# Patient Record
Sex: FEMALE | Race: ASIAN | Employment: OTHER | URBAN - METROPOLITAN AREA
[De-identification: names, ages, dates, MRNs, and addresses within clinical notes are randomized per-mention and may not be internally consistent; named-entity substitution may affect disease eponyms.]

---

## 2017-04-07 ENCOUNTER — TELEPHONE (OUTPATIENT)
Dept: INTERNAL MEDICINE CLINIC | Facility: CLINIC | Age: 42
End: 2017-04-07

## 2017-04-07 NOTE — TELEPHONE ENCOUNTER
Patient is eligible for [facility]. Spoke to patient to assist in scheduling AHA states will call back.

## 2017-04-27 ENCOUNTER — OFFICE VISIT (OUTPATIENT)
Dept: FAMILY MEDICINE CLINIC | Facility: CLINIC | Age: 42
End: 2017-04-27

## 2017-04-27 VITALS
BODY MASS INDEX: 20.96 KG/M2 | WEIGHT: 104 LBS | TEMPERATURE: 99 F | DIASTOLIC BLOOD PRESSURE: 54 MMHG | SYSTOLIC BLOOD PRESSURE: 90 MMHG | HEIGHT: 59 IN | RESPIRATION RATE: 18 BRPM | HEART RATE: 78 BPM

## 2017-04-27 DIAGNOSIS — Z00.00 ROUTINE PHYSICAL EXAMINATION: Primary | ICD-10-CM

## 2017-04-27 DIAGNOSIS — Z12.31 VISIT FOR SCREENING MAMMOGRAM: ICD-10-CM

## 2017-04-27 PROCEDURE — 99396 PREV VISIT EST AGE 40-64: CPT | Performed by: FAMILY MEDICINE

## 2017-04-27 NOTE — PROGRESS NOTES
HPI:    Patient ID: Christina Azevedo is a 40year old female. HPI Comments: Patient is here for routine physical exam and exam for work. No acute issues. No significant chronic medical problems. Patient is requesting testing.  Diet and exercise have been fa edema or tenderness. Lymphadenopathy:     She has no cervical adenopathy. Neurological: She is alert. She has normal reflexes. Skin: No rash noted. Psychiatric: She has a normal mood and affect.  Her behavior is normal. Judgment and thought content

## 2017-04-28 ENCOUNTER — LAB ENCOUNTER (OUTPATIENT)
Dept: LAB | Facility: HOSPITAL | Age: 42
End: 2017-04-28
Attending: FAMILY MEDICINE
Payer: COMMERCIAL

## 2017-04-28 DIAGNOSIS — Z00.00 ROUTINE PHYSICAL EXAMINATION: ICD-10-CM

## 2017-04-28 PROCEDURE — 36415 COLL VENOUS BLD VENIPUNCTURE: CPT

## 2017-04-28 PROCEDURE — 80061 LIPID PANEL: CPT

## 2017-04-28 PROCEDURE — 80053 COMPREHEN METABOLIC PANEL: CPT

## 2017-04-28 PROCEDURE — 86480 TB TEST CELL IMMUN MEASURE: CPT

## 2017-04-28 PROCEDURE — 84443 ASSAY THYROID STIM HORMONE: CPT

## 2017-04-28 PROCEDURE — 85025 COMPLETE CBC W/AUTO DIFF WBC: CPT

## 2018-06-01 ENCOUNTER — OFFICE VISIT (OUTPATIENT)
Dept: INTERNAL MEDICINE CLINIC | Facility: CLINIC | Age: 43
End: 2018-06-01

## 2018-06-01 VITALS
WEIGHT: 108 LBS | TEMPERATURE: 99 F | DIASTOLIC BLOOD PRESSURE: 66 MMHG | BODY MASS INDEX: 21.77 KG/M2 | HEART RATE: 71 BPM | SYSTOLIC BLOOD PRESSURE: 102 MMHG | HEIGHT: 59 IN

## 2018-06-01 DIAGNOSIS — Z00.00 ROUTINE GENERAL MEDICAL EXAMINATION AT A HEALTH CARE FACILITY: Primary | ICD-10-CM

## 2018-06-01 DIAGNOSIS — Z12.31 VISIT FOR SCREENING MAMMOGRAM: ICD-10-CM

## 2018-06-01 DIAGNOSIS — Z12.4 CERVICAL CANCER SCREENING: ICD-10-CM

## 2018-06-01 DIAGNOSIS — Z11.1 SCREENING-PULMONARY TB: ICD-10-CM

## 2018-06-01 DIAGNOSIS — N89.8 VAGINAL DISCHARGE: ICD-10-CM

## 2018-06-01 PROCEDURE — 99386 PREV VISIT NEW AGE 40-64: CPT | Performed by: INTERNAL MEDICINE

## 2018-06-01 RX ORDER — ASCORBIC ACID 500 MG
500 TABLET ORAL DAILY
COMMUNITY

## 2018-06-01 NOTE — PROGRESS NOTES
HPI:    Patient ID: Aniceto Rinne is a 44year old female.     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 She has no axillary adenopathy. Neurological: She is alert. Skin: No rash noted. She is not diaphoretic. No erythema. Nursing note and vitals reviewed.            ASSESSMENT/PLAN:   (Z00.00) R

## 2018-06-04 ENCOUNTER — TELEPHONE (OUTPATIENT)
Dept: OTHER | Age: 43
End: 2018-06-04

## 2018-06-04 ENCOUNTER — TELEPHONE (OUTPATIENT)
Dept: INTERNAL MEDICINE CLINIC | Facility: CLINIC | Age: 43
End: 2018-06-04

## 2018-06-04 NOTE — TELEPHONE ENCOUNTER
Candida vaginitis  positive vaginosis screen  Pt not aware   Call her   diflucan sent to pharmacy for

## 2018-06-04 NOTE — TELEPHONE ENCOUNTER
Yovani Cotter called to provide update on vaginal culture. Reported as Negative for candida but it is actually POSITIVE.   Lab corrected report in EPIC

## 2018-06-05 NOTE — TELEPHONE ENCOUNTER
Pt called to F/U ; Spoke with patient (identified name and ), results reviewed and agrees with plan.         Notes recorded by Claudean Shope, MD on 2018 at 8:51 PM CDT  Candida vaginitis  positive vaginosis screen  Pt not aware   Call her   tatiana

## 2018-06-06 RX ORDER — FLUCONAZOLE 150 MG/1
TABLET ORAL
Qty: 2 TABLET | Refills: 0 | Status: SHIPPED | OUTPATIENT
Start: 2018-06-06 | End: 2019-06-04 | Stop reason: ALTCHOICE

## 2018-06-06 NOTE — TELEPHONE ENCOUNTER
New script created, sent to pharmacy.  Called pharmacy, still did not received the script, verbal order given to pharmacist.

## 2018-06-06 NOTE — TELEPHONE ENCOUNTER
Per pt the rx med that was sent to pharmacy on file, pharmacy stts that they did not received it,  Pls resend pt is waiting.

## 2018-06-16 ENCOUNTER — APPOINTMENT (OUTPATIENT)
Dept: LAB | Facility: HOSPITAL | Age: 43
End: 2018-06-16
Attending: INTERNAL MEDICINE
Payer: COMMERCIAL

## 2018-06-16 DIAGNOSIS — Z00.00 ROUTINE GENERAL MEDICAL EXAMINATION AT A HEALTH CARE FACILITY: ICD-10-CM

## 2018-06-16 DIAGNOSIS — Z11.1 SCREENING-PULMONARY TB: ICD-10-CM

## 2018-06-16 PROCEDURE — 84443 ASSAY THYROID STIM HORMONE: CPT

## 2018-06-16 PROCEDURE — 85027 COMPLETE CBC AUTOMATED: CPT

## 2018-06-16 PROCEDURE — 36415 COLL VENOUS BLD VENIPUNCTURE: CPT

## 2018-06-16 PROCEDURE — 83036 HEMOGLOBIN GLYCOSYLATED A1C: CPT

## 2018-06-16 PROCEDURE — 84439 ASSAY OF FREE THYROXINE: CPT

## 2018-06-16 PROCEDURE — 81015 MICROSCOPIC EXAM OF URINE: CPT

## 2018-06-16 PROCEDURE — 86480 TB TEST CELL IMMUN MEASURE: CPT

## 2018-06-16 PROCEDURE — 80053 COMPREHEN METABOLIC PANEL: CPT

## 2018-06-16 PROCEDURE — 80061 LIPID PANEL: CPT

## 2018-12-21 ENCOUNTER — NURSE TRIAGE (OUTPATIENT)
Dept: OTHER | Age: 43
End: 2018-12-21

## 2018-12-21 DIAGNOSIS — N92.6 MISSED PERIOD: Primary | ICD-10-CM

## 2018-12-21 NOTE — TELEPHONE ENCOUNTER
Action Requested: Summary for Provider     []  Critical Lab, Recommendations Needed  [] Need Additional Advice  []   FYI    []   Need Orders  [] Need Medications Sent to Pharmacy  []  Other     SUMMARY: The patient is requesting a blood test for pregnancy.

## 2018-12-22 ENCOUNTER — APPOINTMENT (OUTPATIENT)
Dept: LAB | Facility: HOSPITAL | Age: 43
End: 2018-12-22
Attending: INTERNAL MEDICINE
Payer: COMMERCIAL

## 2018-12-22 PROCEDURE — 36415 COLL VENOUS BLD VENIPUNCTURE: CPT | Performed by: INTERNAL MEDICINE

## 2018-12-22 PROCEDURE — 84703 CHORIONIC GONADOTROPIN ASSAY: CPT | Performed by: INTERNAL MEDICINE

## 2019-01-21 ENCOUNTER — TELEPHONE (OUTPATIENT)
Dept: INTERNAL MEDICINE CLINIC | Facility: CLINIC | Age: 44
End: 2019-01-21

## 2019-01-21 NOTE — TELEPHONE ENCOUNTER
Called Pt to clarify why she was requesting an x-ray of the lower back . Pt states she slipped and fell on icy area is currently having pain on her sacral area . Pain when walking or sitting declined appt at this time . Dr Freedom Hewitt please advise .

## 2019-01-25 ENCOUNTER — HOSPITAL ENCOUNTER (OUTPATIENT)
Dept: GENERAL RADIOLOGY | Facility: HOSPITAL | Age: 44
Discharge: HOME | End: 2019-01-25
Attending: INTERNAL MEDICINE
Payer: COMMERCIAL

## 2019-01-25 DIAGNOSIS — M54.5 ACUTE LOW BACK PAIN, UNSPECIFIED BACK PAIN LATERALITY, WITH SCIATICA PRESENCE UNSPECIFIED: ICD-10-CM

## 2019-01-25 PROCEDURE — 72220 X-RAY EXAM SACRUM TAILBONE: CPT | Performed by: INTERNAL MEDICINE

## 2019-03-06 ENCOUNTER — WALK IN (OUTPATIENT)
Dept: URGENT CARE | Age: 44
End: 2019-03-06

## 2019-03-06 VITALS
TEMPERATURE: 98.2 F | HEART RATE: 69 BPM | HEIGHT: 60 IN | DIASTOLIC BLOOD PRESSURE: 70 MMHG | BODY MASS INDEX: 21.2 KG/M2 | OXYGEN SATURATION: 98 % | WEIGHT: 108 LBS | SYSTOLIC BLOOD PRESSURE: 110 MMHG | RESPIRATION RATE: 18 BRPM

## 2019-03-06 DIAGNOSIS — J20.9 ACUTE BRONCHITIS, UNSPECIFIED ORGANISM: Primary | ICD-10-CM

## 2019-03-06 PROCEDURE — 99203 OFFICE O/P NEW LOW 30 MIN: CPT | Performed by: NURSE PRACTITIONER

## 2019-03-06 RX ORDER — BENZONATATE 200 MG/1
200 CAPSULE ORAL 3 TIMES DAILY PRN
Qty: 15 CAPSULE | Refills: 0 | Status: SHIPPED | OUTPATIENT
Start: 2019-03-06 | End: 2019-03-11

## 2019-03-06 RX ORDER — ASCORBIC ACID 500 MG
500 TABLET ORAL DAILY
COMMUNITY

## 2019-03-06 ASSESSMENT — ENCOUNTER SYMPTOMS
SINUS PAIN: 0
HEADACHES: 0
DIARRHEA: 0
CHEST TIGHTNESS: 0
SWEATS: 0
NAUSEA: 0
WHEEZING: 0
RHINORRHEA: 0
SLEEP DISTURBANCE: 0
CHILLS: 0
WEAKNESS: 0
EYE DISCHARGE: 0
HEMOPTYSIS: 0
EYE PAIN: 0
NERVOUS/ANXIOUS: 0
ABDOMINAL PAIN: 0
VOICE CHANGE: 0
EYE ITCHING: 0
FATIGUE: 0
FEVER: 0
ACTIVITY CHANGE: 0
LIGHT-HEADEDNESS: 0
VOMITING: 0
COUGH: 1
HEARTBURN: 0
PHOTOPHOBIA: 0
SORE THROAT: 0
SHORTNESS OF BREATH: 0
ADENOPATHY: 0
DIZZINESS: 0
EYE REDNESS: 0
CONSTIPATION: 0
APPETITE CHANGE: 0
TROUBLE SWALLOWING: 0
WEIGHT LOSS: 0
CONFUSION: 0
SINUS PRESSURE: 0

## 2019-03-06 ASSESSMENT — COPD QUESTIONNAIRES: COPD: 0

## 2019-06-04 ENCOUNTER — OFFICE VISIT (OUTPATIENT)
Dept: INTERNAL MEDICINE CLINIC | Facility: CLINIC | Age: 44
End: 2019-06-04
Payer: COMMERCIAL

## 2019-06-04 VITALS
WEIGHT: 110 LBS | SYSTOLIC BLOOD PRESSURE: 96 MMHG | BODY MASS INDEX: 22.18 KG/M2 | DIASTOLIC BLOOD PRESSURE: 62 MMHG | HEIGHT: 59 IN | HEART RATE: 69 BPM

## 2019-06-04 DIAGNOSIS — Z12.31 VISIT FOR SCREENING MAMMOGRAM: ICD-10-CM

## 2019-06-04 DIAGNOSIS — Z00.00 ROUTINE GENERAL MEDICAL EXAMINATION AT A HEALTH CARE FACILITY: Primary | ICD-10-CM

## 2019-06-04 DIAGNOSIS — Z11.1 SCREENING-PULMONARY TB: ICD-10-CM

## 2019-06-04 PROCEDURE — 99396 PREV VISIT EST AGE 40-64: CPT | Performed by: INTERNAL MEDICINE

## 2019-06-04 RX ORDER — ACETAMINOPHEN 500 MG
500 TABLET ORAL EVERY 6 HOURS PRN
COMMUNITY

## 2019-06-14 ENCOUNTER — APPOINTMENT (OUTPATIENT)
Dept: LAB | Facility: HOSPITAL | Age: 44
End: 2019-06-14
Attending: INTERNAL MEDICINE
Payer: COMMERCIAL

## 2019-06-14 DIAGNOSIS — Z00.00 ROUTINE GENERAL MEDICAL EXAMINATION AT A HEALTH CARE FACILITY: ICD-10-CM

## 2019-06-14 DIAGNOSIS — Z11.1 SCREENING-PULMONARY TB: ICD-10-CM

## 2019-06-14 PROCEDURE — 83036 HEMOGLOBIN GLYCOSYLATED A1C: CPT

## 2019-06-14 PROCEDURE — 85027 COMPLETE CBC AUTOMATED: CPT

## 2019-06-14 PROCEDURE — 81015 MICROSCOPIC EXAM OF URINE: CPT

## 2019-06-14 PROCEDURE — 80053 COMPREHEN METABOLIC PANEL: CPT

## 2019-06-14 PROCEDURE — 86480 TB TEST CELL IMMUN MEASURE: CPT

## 2019-06-14 PROCEDURE — 84439 ASSAY OF FREE THYROXINE: CPT

## 2019-06-14 PROCEDURE — 84443 ASSAY THYROID STIM HORMONE: CPT

## 2019-06-14 PROCEDURE — 36415 COLL VENOUS BLD VENIPUNCTURE: CPT

## 2019-06-14 PROCEDURE — 80061 LIPID PANEL: CPT

## 2019-06-14 NOTE — PROGRESS NOTES
HPI:    Patient ID: Glorietta Peabody is a 38year old female.     HPI    PHysical exam  Generally healthy    BP 96/62 (BP Location: Right arm, Patient Position: Sitting, Cuff Size: adult)   Pulse 69   Ht 4' 11\" (1.499 m)   Wt 110 lb (49.9 kg)   LMP 05/17/2019 Diagnosis Date   • Bilateral dry eyes 3/24/2016   • Meibomian gland dysfunction (MGD), bilateral, both upper and lower lids 3/24/2016      Past Surgical History:   Procedure Laterality Date   • SKIN SURGERY  2005    sebaceous cyst removal between breasts THYROID STIM HORMONE, FREE T4 (FREE         THYROXINE), LIPID PANEL, CBC, PLATELET; NO         DIFFERENTIAL, COMP METABOLIC PANEL (14),         HEMOGLOBIN A1C, URINE MICROSCOPIC W REFLEX         CULTURE        Generally healthy    (Z12.31) Visit for screen

## 2020-06-12 ENCOUNTER — OFFICE VISIT (OUTPATIENT)
Dept: INTERNAL MEDICINE CLINIC | Facility: CLINIC | Age: 45
End: 2020-06-12
Payer: COMMERCIAL

## 2020-06-12 VITALS
HEART RATE: 71 BPM | DIASTOLIC BLOOD PRESSURE: 55 MMHG | WEIGHT: 109 LBS | BODY MASS INDEX: 21.97 KG/M2 | SYSTOLIC BLOOD PRESSURE: 87 MMHG | HEIGHT: 59 IN | TEMPERATURE: 99 F

## 2020-06-12 DIAGNOSIS — Z00.00 ROUTINE GENERAL MEDICAL EXAMINATION AT A HEALTH CARE FACILITY: Primary | ICD-10-CM

## 2020-06-12 DIAGNOSIS — Z12.31 VISIT FOR SCREENING MAMMOGRAM: ICD-10-CM

## 2020-06-12 DIAGNOSIS — Z11.1 SCREENING-PULMONARY TB: ICD-10-CM

## 2020-06-12 PROCEDURE — 99396 PREV VISIT EST AGE 40-64: CPT | Performed by: INTERNAL MEDICINE

## 2020-06-12 NOTE — PROGRESS NOTES
HPI:    Patient ID: Tyson Carmona is a 41year old female.     HPI    Physical exam  Generally healthy  Aches and pain  Neck upper back  And right arm occas tingling  Works as a nurse  Generally healthy    BP (!) 87/55 (BP Location: Left arm, Patient Posi STRENGTH) 500 MG Oral Tab Take 500 mg by mouth every 6 (six) hours as needed for Pain. • Vitamin C 500 MG Oral Tab Take 500 mg by mouth daily. • folic acid (FOLVITE) 877 MCG Oral Tab Take 400 mcg by mouth daily.        Allergies:No Known Allergies Genitourinary:    Genitourinary Comments: . Breast exam.  Bilateral symmetric  No discrete palpable masses or tenderness  No nipple discharge  And no axillary adenopathy. Musculoskeletal:         General: No tenderness or edema.    Lymphadenopathy:

## 2020-06-13 ENCOUNTER — APPOINTMENT (OUTPATIENT)
Dept: LAB | Facility: HOSPITAL | Age: 45
End: 2020-06-13
Attending: INTERNAL MEDICINE
Payer: COMMERCIAL

## 2020-06-13 DIAGNOSIS — Z00.00 ROUTINE GENERAL MEDICAL EXAMINATION AT A HEALTH CARE FACILITY: ICD-10-CM

## 2020-06-13 PROCEDURE — 83036 HEMOGLOBIN GLYCOSYLATED A1C: CPT

## 2020-06-13 PROCEDURE — 80053 COMPREHEN METABOLIC PANEL: CPT

## 2020-06-13 PROCEDURE — 93005 ELECTROCARDIOGRAM TRACING: CPT

## 2020-06-13 PROCEDURE — 93010 ELECTROCARDIOGRAM REPORT: CPT | Performed by: INTERNAL MEDICINE

## 2020-06-13 PROCEDURE — 84443 ASSAY THYROID STIM HORMONE: CPT

## 2020-06-13 PROCEDURE — 84439 ASSAY OF FREE THYROXINE: CPT

## 2020-06-13 PROCEDURE — 85027 COMPLETE CBC AUTOMATED: CPT

## 2020-06-13 PROCEDURE — 86480 TB TEST CELL IMMUN MEASURE: CPT

## 2020-06-13 PROCEDURE — 80061 LIPID PANEL: CPT

## 2020-06-13 PROCEDURE — 36415 COLL VENOUS BLD VENIPUNCTURE: CPT

## 2021-01-21 ENCOUNTER — PATIENT MESSAGE (OUTPATIENT)
Dept: INTERNAL MEDICINE CLINIC | Facility: CLINIC | Age: 46
End: 2021-01-21

## 2021-01-21 ENCOUNTER — NURSE TRIAGE (OUTPATIENT)
Dept: INTERNAL MEDICINE CLINIC | Facility: CLINIC | Age: 46
End: 2021-01-21

## 2021-01-22 ENCOUNTER — OFFICE VISIT (OUTPATIENT)
Dept: INTERNAL MEDICINE CLINIC | Facility: CLINIC | Age: 46
End: 2021-01-22
Payer: COMMERCIAL

## 2021-01-22 VITALS
WEIGHT: 112 LBS | DIASTOLIC BLOOD PRESSURE: 73 MMHG | HEIGHT: 59 IN | BODY MASS INDEX: 22.58 KG/M2 | HEART RATE: 83 BPM | SYSTOLIC BLOOD PRESSURE: 107 MMHG

## 2021-01-22 DIAGNOSIS — T50.905A REACTION TO SHOT, INITIAL ENCOUNTER: Primary | ICD-10-CM

## 2021-01-22 PROCEDURE — 3078F DIAST BP <80 MM HG: CPT | Performed by: INTERNAL MEDICINE

## 2021-01-22 PROCEDURE — 3074F SYST BP LT 130 MM HG: CPT | Performed by: INTERNAL MEDICINE

## 2021-01-22 PROCEDURE — 99212 OFFICE O/P EST SF 10 MIN: CPT | Performed by: INTERNAL MEDICINE

## 2021-01-22 PROCEDURE — 3008F BODY MASS INDEX DOCD: CPT | Performed by: INTERNAL MEDICINE

## 2021-01-22 NOTE — TELEPHONE ENCOUNTER
[address] [place] [address] 3:40 pm Dr Tomasa Chaudhary today. Advised to wear mask/medical mask, at door screening, care partner policy; patient verbalized understanding. See ComptTIAhart message below. Patient had Benitez Ramirez covid vaccine 1/13/2021 on left deltoid.  Today, area

## 2021-01-22 NOTE — TELEPHONE ENCOUNTER
----- Message from Tuyet Ramon sent at 1/21/2021  9:15 PM CST -----  Regarding: Other  Contact: [phone]  Good Evening! I had my first dose of Moderna Covid-19  vaccine last 1/13/21. I had chills for 2 nights and sore arm for almost a week.  2 da

## 2021-01-22 NOTE — TELEPHONE ENCOUNTER
From: Ori Longoria  To: Zack Hernadez MD  Sent: 1/21/2021 9:15 PM CST  Subject: Other    Good Evening! I had my first dose of Moderna Covid-19 vaccine last 1/13/21. I had chills for 2 nights and sore arm for almost a week.  2 days after the vacci

## 2021-01-24 NOTE — PROGRESS NOTES
HPI:    Patient ID: Carmen Yeboah is a 41year old female.     HPI    moderna vaccine   Site left deltoid developed redness warmth swelling  No other associ symptoms   Worse yesterday now improivng    /73 (BP Location: Left arm, Patient Position Relation Age of Onset   • Hypertension Father    • Hypertension Brother    • Glaucoma Brother    • Diabetes Neg    • Macular degeneration Neg       Social History: Social History    Tobacco Use      Smoking status: Never Smoker      Smokeless tobacco: Valentina Soliman

## 2021-06-22 ENCOUNTER — OFFICE VISIT (OUTPATIENT)
Dept: INTERNAL MEDICINE CLINIC | Facility: CLINIC | Age: 46
End: 2021-06-22
Payer: COMMERCIAL

## 2021-06-22 VITALS
HEART RATE: 75 BPM | WEIGHT: 107.19 LBS | BODY MASS INDEX: 21.61 KG/M2 | DIASTOLIC BLOOD PRESSURE: 58 MMHG | HEIGHT: 59 IN | SYSTOLIC BLOOD PRESSURE: 93 MMHG

## 2021-06-22 DIAGNOSIS — Z00.00 ROUTINE GENERAL MEDICAL EXAMINATION AT A HEALTH CARE FACILITY: Primary | ICD-10-CM

## 2021-06-22 DIAGNOSIS — Z12.31 VISIT FOR SCREENING MAMMOGRAM: ICD-10-CM

## 2021-06-22 DIAGNOSIS — Z11.1 SCREENING-PULMONARY TB: ICD-10-CM

## 2021-06-22 DIAGNOSIS — Z12.4 CERVICAL CANCER SCREENING: ICD-10-CM

## 2021-06-22 PROCEDURE — 3078F DIAST BP <80 MM HG: CPT | Performed by: INTERNAL MEDICINE

## 2021-06-22 PROCEDURE — 3074F SYST BP LT 130 MM HG: CPT | Performed by: INTERNAL MEDICINE

## 2021-06-22 PROCEDURE — 99396 PREV VISIT EST AGE 40-64: CPT | Performed by: INTERNAL MEDICINE

## 2021-06-22 PROCEDURE — 3008F BODY MASS INDEX DOCD: CPT | Performed by: INTERNAL MEDICINE

## 2021-06-23 LAB — HPV I/H RISK 1 DNA SPEC QL NAA+PROBE: NEGATIVE

## 2021-06-25 ENCOUNTER — LAB ENCOUNTER (OUTPATIENT)
Dept: LAB | Facility: HOSPITAL | Age: 46
End: 2021-06-25
Attending: INTERNAL MEDICINE
Payer: COMMERCIAL

## 2021-06-25 DIAGNOSIS — Z11.1 SCREENING EXAMINATION FOR PULMONARY TUBERCULOSIS: Primary | ICD-10-CM

## 2021-06-25 DIAGNOSIS — Z00.00 ROUTINE GENERAL MEDICAL EXAMINATION AT A HEALTH CARE FACILITY: ICD-10-CM

## 2021-06-25 PROCEDURE — 86480 TB TEST CELL IMMUN MEASURE: CPT

## 2021-06-25 PROCEDURE — 81015 MICROSCOPIC EXAM OF URINE: CPT

## 2021-06-25 PROCEDURE — 80061 LIPID PANEL: CPT

## 2021-06-25 PROCEDURE — 85027 COMPLETE CBC AUTOMATED: CPT

## 2021-06-25 PROCEDURE — 36415 COLL VENOUS BLD VENIPUNCTURE: CPT

## 2021-06-25 PROCEDURE — 84439 ASSAY OF FREE THYROXINE: CPT

## 2021-06-25 PROCEDURE — 80053 COMPREHEN METABOLIC PANEL: CPT

## 2021-06-25 PROCEDURE — 83036 HEMOGLOBIN GLYCOSYLATED A1C: CPT

## 2021-06-25 PROCEDURE — 84443 ASSAY THYROID STIM HORMONE: CPT

## 2021-07-01 NOTE — PROGRESS NOTES
HPI:    Patient ID: Reinier Garcia is a 40year old female.     HPI    BP 93/58 (BP Location: Right arm, Patient Position: Sitting, Cuff Size: adult)   Pulse 75   Ht 4' 11\" (1.499 m)   Wt 107 lb 3.2 oz (48.6 kg)   LMP 04/12/2021 (Within Months)   BMI (MULTI-VITAMIN) Oral Tab Take 1 tablet by mouth daily. • acetaminophen (TYLENOL EXTRA STRENGTH) 500 MG Oral Tab Take 500 mg by mouth every 6 (six) hours as needed for Pain. • Vitamin C 500 MG Oral Tab Take 500 mg by mouth daily.      • folic acid (F distress. Breath sounds: Normal breath sounds. No wheezing or rales. Chest:      Chest wall: No tenderness. Abdominal:      General: Bowel sounds are normal. There is no distension. Palpations: Abdomen is soft. There is no mass.       Lisha Nathan A1C      Urine Microscopic w Reflex CULTURE      QUANTIFERON TB GOLD (IN TUBE)      Hpv Dna  High Risk , Thin Prep Collect      ThinPrep PAP Smear      THINPREP PAP SMEAR ONLY      Meds This Visit:  Requested Prescriptions      No prescriptions requested o

## 2021-07-16 ENCOUNTER — LAB ENCOUNTER (OUTPATIENT)
Dept: LAB | Facility: HOSPITAL | Age: 46
End: 2021-07-16
Attending: INTERNAL MEDICINE
Payer: COMMERCIAL

## 2021-07-16 DIAGNOSIS — R82.90 ABNORMAL URINE: ICD-10-CM

## 2021-07-16 PROCEDURE — 81015 MICROSCOPIC EXAM OF URINE: CPT

## 2021-08-26 ENCOUNTER — NURSE TRIAGE (OUTPATIENT)
Dept: INTERNAL MEDICINE CLINIC | Facility: CLINIC | Age: 46
End: 2021-08-26

## 2021-08-26 ENCOUNTER — PATIENT MESSAGE (OUTPATIENT)
Dept: INTERNAL MEDICINE CLINIC | Facility: CLINIC | Age: 46
End: 2021-08-26

## 2021-08-26 NOTE — TELEPHONE ENCOUNTER
----- Message from Tuyet Antony sent at 8/26/2021  5:57 PM CDT -----  Regarding: Other  Contact: [phone]  Good afternoon    Please refer to attached photos.   The symptoms (redness, itching, swelling)  started yesterday noon and have gotten worse t

## 2021-08-26 NOTE — TELEPHONE ENCOUNTER
Please contact pt to triage.   See my chart message below    Please see other my chart message with images

## 2021-08-26 NOTE — TELEPHONE ENCOUNTER
From: Oscar Hancock  To: Annie Benavidez MD  Sent: 8/26/2021 5:57 PM CDT  Subject: Other    Good afternoon    Please refer to attached photos. The symptoms (redness, itching, swelling) started yesterday noon and have gotten worse today.  I don't have

## 2021-08-27 NOTE — TELEPHONE ENCOUNTER
Action Requested: Summary for Provider     []  Critical Lab, Recommendations Needed  [] Need Additional Advice  [x]   FYI    []   Need Orders  [] Need Medications Sent to Pharmacy  []  Other     SUMMARY: Patient compliant of rash on left leg that started o

## 2021-08-28 NOTE — TELEPHONE ENCOUNTER
Per chart review, patient is currently at [facility]    No further action needed by Triage

## 2022-06-14 NOTE — LETTER
10/16/2024              Chris Weaver        [address] [place] [id number]     To Whom It May Concern,    Chris Weaver (3/17/1975) is under my medical care. She was seen for her complete physical examination on 10/16.24. She is was found in excellent health   and is free of communicable diseases.  She may work without restrictions.    Sincerely,    Akosua Alvarez MD          Document electronically generated by:  Akosua Alvarez MD   
10/16/2024              Chris Weaver        [address] [place] [id number]     To Whom It May Concern,    Chris Weaver (3/17/1975) is under my medical care. She was seen for her complete physical examination and was found in excellent health  free of communicable diseases.  She may work without restrictions.    Sincerely,    Akosua Alvarez MD          Document electronically generated by:  Akosua Alvarez MD   
Mother's Bedside/Non-

## 2022-08-03 ENCOUNTER — OFFICE VISIT (OUTPATIENT)
Dept: INTERNAL MEDICINE CLINIC | Facility: CLINIC | Age: 47
End: 2022-08-03
Payer: COMMERCIAL

## 2022-08-03 VITALS
SYSTOLIC BLOOD PRESSURE: 100 MMHG | DIASTOLIC BLOOD PRESSURE: 64 MMHG | WEIGHT: 113 LBS | HEART RATE: 71 BPM | HEIGHT: 59 IN | BODY MASS INDEX: 22.78 KG/M2

## 2022-08-03 DIAGNOSIS — Z11.1 SCREENING-PULMONARY TB: ICD-10-CM

## 2022-08-03 DIAGNOSIS — Z12.11 SCREENING FOR COLON CANCER: ICD-10-CM

## 2022-08-03 DIAGNOSIS — Z12.4 SCREENING FOR CERVICAL CANCER: ICD-10-CM

## 2022-08-03 DIAGNOSIS — Z00.00 PHYSICAL EXAM: Primary | ICD-10-CM

## 2022-08-03 DIAGNOSIS — Z12.31 VISIT FOR SCREENING MAMMOGRAM: ICD-10-CM

## 2022-08-03 PROCEDURE — 3008F BODY MASS INDEX DOCD: CPT | Performed by: INTERNAL MEDICINE

## 2022-08-03 PROCEDURE — 99396 PREV VISIT EST AGE 40-64: CPT | Performed by: INTERNAL MEDICINE

## 2022-08-03 PROCEDURE — 3074F SYST BP LT 130 MM HG: CPT | Performed by: INTERNAL MEDICINE

## 2022-08-03 PROCEDURE — 3078F DIAST BP <80 MM HG: CPT | Performed by: INTERNAL MEDICINE

## 2022-08-03 RX ORDER — DIPHENHYDRAMINE HCL 25 MG/1
CAPSULE, LIQUID FILLED ORAL
COMMUNITY

## 2022-08-03 RX ORDER — CHLORAL HYDRATE 500 MG
1000 CAPSULE ORAL DAILY
COMMUNITY

## 2022-10-14 ENCOUNTER — EKG ENCOUNTER (OUTPATIENT)
Dept: LAB | Facility: HOSPITAL | Age: 47
End: 2022-10-14
Attending: INTERNAL MEDICINE
Payer: COMMERCIAL

## 2022-10-14 DIAGNOSIS — Z00.00 PHYSICAL EXAM: ICD-10-CM

## 2022-10-14 DIAGNOSIS — Z11.1 SCREENING-PULMONARY TB: ICD-10-CM

## 2022-10-14 LAB
ALBUMIN SERPL-MCNC: 3.8 G/DL (ref 3.4–5)
ALBUMIN/GLOB SERPL: 1.1 {RATIO} (ref 1–2)
ALP LIVER SERPL-CCNC: 59 U/L
ALT SERPL-CCNC: 38 U/L
ANION GAP SERPL CALC-SCNC: 4 MMOL/L (ref 0–18)
AST SERPL-CCNC: 18 U/L (ref 15–37)
BASOPHILS # BLD AUTO: 0.04 X10(3) UL (ref 0–0.2)
BASOPHILS NFR BLD AUTO: 0.8 %
BILIRUB SERPL-MCNC: 0.6 MG/DL (ref 0.1–2)
BILIRUB UR QL: NEGATIVE
BUN BLD-MCNC: 14 MG/DL (ref 7–18)
BUN/CREAT SERPL: 29.2 (ref 10–20)
CALCIUM BLD-MCNC: 8.6 MG/DL (ref 8.5–10.1)
CHLORIDE SERPL-SCNC: 110 MMOL/L (ref 98–112)
CHOLEST SERPL-MCNC: 175 MG/DL (ref ?–200)
CLARITY UR: CLEAR
CO2 SERPL-SCNC: 23 MMOL/L (ref 21–32)
COLOR UR: YELLOW
CREAT BLD-MCNC: 0.48 MG/DL
DEPRECATED RDW RBC AUTO: 44.2 FL (ref 35.1–46.3)
EOSINOPHIL # BLD AUTO: 0.11 X10(3) UL (ref 0–0.7)
EOSINOPHIL NFR BLD AUTO: 2.1 %
ERYTHROCYTE [DISTWIDTH] IN BLOOD BY AUTOMATED COUNT: 12.7 % (ref 11–15)
EST. AVERAGE GLUCOSE BLD GHB EST-MCNC: 108 MG/DL (ref 68–126)
FASTING PATIENT LIPID ANSWER: YES
FASTING STATUS PATIENT QL REPORTED: YES
GFR SERPLBLD BASED ON 1.73 SQ M-ARVRAT: 118 ML/MIN/1.73M2 (ref 60–?)
GLOBULIN PLAS-MCNC: 3.5 G/DL (ref 2.8–4.4)
GLUCOSE BLD-MCNC: 92 MG/DL (ref 70–99)
GLUCOSE UR-MCNC: NEGATIVE MG/DL
HBA1C MFR BLD: 5.4 % (ref ?–5.7)
HCT VFR BLD AUTO: 43.5 %
HDLC SERPL-MCNC: 93 MG/DL (ref 40–59)
HGB BLD-MCNC: 14.1 G/DL
HGB UR QL STRIP.AUTO: NEGATIVE
IMM GRANULOCYTES # BLD AUTO: 0.01 X10(3) UL (ref 0–1)
IMM GRANULOCYTES NFR BLD: 0.2 %
KETONES UR-MCNC: 15 MG/DL
LDLC SERPL CALC-MCNC: 75 MG/DL (ref ?–100)
LEUKOCYTE ESTERASE UR QL STRIP.AUTO: NEGATIVE
LYMPHOCYTES # BLD AUTO: 2.06 X10(3) UL (ref 1–4)
LYMPHOCYTES NFR BLD AUTO: 39 %
MCH RBC QN AUTO: 30.3 PG (ref 26–34)
MCHC RBC AUTO-ENTMCNC: 32.4 G/DL (ref 31–37)
MCV RBC AUTO: 93.3 FL
MONOCYTES # BLD AUTO: 0.41 X10(3) UL (ref 0.1–1)
MONOCYTES NFR BLD AUTO: 7.8 %
NEUTROPHILS # BLD AUTO: 2.65 X10 (3) UL (ref 1.5–7.7)
NEUTROPHILS # BLD AUTO: 2.65 X10(3) UL (ref 1.5–7.7)
NEUTROPHILS NFR BLD AUTO: 50.1 %
NITRITE UR QL STRIP.AUTO: NEGATIVE
NONHDLC SERPL-MCNC: 82 MG/DL (ref ?–130)
OSMOLALITY SERPL CALC.SUM OF ELEC: 284 MOSM/KG (ref 275–295)
PH UR: 6 [PH] (ref 5–8)
PLATELET # BLD AUTO: 174 10(3)UL (ref 150–450)
POTASSIUM SERPL-SCNC: 4 MMOL/L (ref 3.5–5.1)
PROT SERPL-MCNC: 7.3 G/DL (ref 6.4–8.2)
PROT UR-MCNC: NEGATIVE MG/DL
RBC # BLD AUTO: 4.66 X10(6)UL
SODIUM SERPL-SCNC: 137 MMOL/L (ref 136–145)
SP GR UR STRIP: 1.02 (ref 1–1.03)
T4 FREE SERPL-MCNC: 1 NG/DL (ref 0.8–1.7)
TRIGL SERPL-MCNC: 30 MG/DL (ref 30–149)
TSI SER-ACNC: 1.71 MIU/ML (ref 0.36–3.74)
UROBILINOGEN UR STRIP-ACNC: 0.2
VLDLC SERPL CALC-MCNC: 5 MG/DL (ref 0–30)
WBC # BLD AUTO: 5.3 X10(3) UL (ref 4–11)

## 2022-10-14 PROCEDURE — 85025 COMPLETE CBC W/AUTO DIFF WBC: CPT

## 2022-10-14 PROCEDURE — 81003 URINALYSIS AUTO W/O SCOPE: CPT

## 2022-10-14 PROCEDURE — 93010 ELECTROCARDIOGRAM REPORT: CPT | Performed by: INTERNAL MEDICINE

## 2022-10-14 PROCEDURE — 93005 ELECTROCARDIOGRAM TRACING: CPT

## 2022-10-14 PROCEDURE — 83036 HEMOGLOBIN GLYCOSYLATED A1C: CPT

## 2022-10-14 PROCEDURE — 80061 LIPID PANEL: CPT

## 2022-10-14 PROCEDURE — 80053 COMPREHEN METABOLIC PANEL: CPT

## 2022-10-14 PROCEDURE — 36415 COLL VENOUS BLD VENIPUNCTURE: CPT

## 2022-10-14 PROCEDURE — 84443 ASSAY THYROID STIM HORMONE: CPT

## 2022-10-14 PROCEDURE — 86480 TB TEST CELL IMMUN MEASURE: CPT

## 2022-10-14 PROCEDURE — 84439 ASSAY OF FREE THYROXINE: CPT

## 2022-10-17 LAB
M TB IFN-G CD4+ T-CELLS BLD-ACNC: 0.15 IU/ML
M TB TUBERC IFN-G BLD QL: NEGATIVE
M TB TUBERC IGNF/MITOGEN IGNF CONTROL: >10 IU/ML
QFT TB1 AG MINUS NIL: -0.04 IU/ML
QFT TB2 AG MINUS NIL: 0.01 IU/ML

## 2023-03-28 ENCOUNTER — OFFICE VISIT (OUTPATIENT)
Dept: INTERNAL MEDICINE CLINIC | Facility: CLINIC | Age: 48
End: 2023-03-28

## 2023-03-28 VITALS
TEMPERATURE: 98 F | WEIGHT: 118 LBS | HEART RATE: 55 BPM | SYSTOLIC BLOOD PRESSURE: 113 MMHG | DIASTOLIC BLOOD PRESSURE: 77 MMHG | BODY MASS INDEX: 23.79 KG/M2 | HEIGHT: 59 IN

## 2023-03-28 DIAGNOSIS — J06.9 URI WITH COUGH AND CONGESTION: Primary | ICD-10-CM

## 2023-03-28 PROCEDURE — 3078F DIAST BP <80 MM HG: CPT | Performed by: NURSE PRACTITIONER

## 2023-03-28 PROCEDURE — 3008F BODY MASS INDEX DOCD: CPT | Performed by: NURSE PRACTITIONER

## 2023-03-28 PROCEDURE — 3074F SYST BP LT 130 MM HG: CPT | Performed by: NURSE PRACTITIONER

## 2023-03-28 PROCEDURE — 99213 OFFICE O/P EST LOW 20 MIN: CPT | Performed by: NURSE PRACTITIONER

## 2023-03-28 RX ORDER — AMOXICILLIN AND CLAVULANATE POTASSIUM 875; 125 MG/1; MG/1
1 TABLET, FILM COATED ORAL 2 TIMES DAILY
Qty: 14 TABLET | Refills: 0 | Status: SHIPPED | OUTPATIENT
Start: 2023-03-28 | End: 2023-04-04

## 2023-03-28 RX ORDER — BENZONATATE 100 MG/1
100 CAPSULE ORAL 3 TIMES DAILY PRN
Qty: 30 CAPSULE | Refills: 0 | Status: SHIPPED | OUTPATIENT
Start: 2023-03-28

## 2023-09-26 ENCOUNTER — OFFICE VISIT (OUTPATIENT)
Dept: INTERNAL MEDICINE CLINIC | Facility: CLINIC | Age: 48
End: 2023-09-26

## 2023-09-26 VITALS
WEIGHT: 115 LBS | HEIGHT: 59 IN | DIASTOLIC BLOOD PRESSURE: 71 MMHG | HEART RATE: 78 BPM | BODY MASS INDEX: 23.18 KG/M2 | SYSTOLIC BLOOD PRESSURE: 108 MMHG

## 2023-09-26 DIAGNOSIS — Z12.31 VISIT FOR SCREENING MAMMOGRAM: ICD-10-CM

## 2023-09-26 DIAGNOSIS — Z12.11 SCREENING FOR COLON CANCER: ICD-10-CM

## 2023-09-26 DIAGNOSIS — R20.2 PARESTHESIA: ICD-10-CM

## 2023-09-26 DIAGNOSIS — Z11.1 SCREENING-PULMONARY TB: ICD-10-CM

## 2023-09-26 DIAGNOSIS — Z00.00 PHYSICAL EXAM: Primary | ICD-10-CM

## 2023-09-26 DIAGNOSIS — Z12.4 SCREENING FOR CERVICAL CANCER: ICD-10-CM

## 2023-09-26 PROCEDURE — 3008F BODY MASS INDEX DOCD: CPT | Performed by: INTERNAL MEDICINE

## 2023-09-26 PROCEDURE — 99396 PREV VISIT EST AGE 40-64: CPT | Performed by: INTERNAL MEDICINE

## 2023-09-26 PROCEDURE — 3078F DIAST BP <80 MM HG: CPT | Performed by: INTERNAL MEDICINE

## 2023-09-26 PROCEDURE — 3074F SYST BP LT 130 MM HG: CPT | Performed by: INTERNAL MEDICINE

## 2023-09-26 RX ORDER — IBUPROFEN 600 MG/1
600 TABLET ORAL EVERY 6 HOURS PRN
COMMUNITY

## 2023-10-19 ENCOUNTER — LAB ENCOUNTER (OUTPATIENT)
Dept: LAB | Facility: HOSPITAL | Age: 48
End: 2023-10-19
Attending: INTERNAL MEDICINE
Payer: COMMERCIAL

## 2023-10-19 DIAGNOSIS — Z00.00 PHYSICAL EXAM: ICD-10-CM

## 2023-10-19 DIAGNOSIS — Z11.1 SCREENING-PULMONARY TB: ICD-10-CM

## 2023-10-19 DIAGNOSIS — Z00.00 ROUTINE GENERAL MEDICAL EXAMINATION AT A HEALTH CARE FACILITY: Primary | ICD-10-CM

## 2023-10-19 LAB
ALBUMIN SERPL-MCNC: 3.8 G/DL (ref 3.4–5)
ALBUMIN/GLOB SERPL: 1.1 {RATIO} (ref 1–2)
ALP LIVER SERPL-CCNC: 53 U/L
ALT SERPL-CCNC: 27 U/L
ANION GAP SERPL CALC-SCNC: 7 MMOL/L (ref 0–18)
AST SERPL-CCNC: 17 U/L (ref 15–37)
ATRIAL RATE: 64 BPM
BASOPHILS # BLD AUTO: 0.04 X10(3) UL (ref 0–0.2)
BASOPHILS NFR BLD AUTO: 0.9 %
BILIRUB SERPL-MCNC: 0.6 MG/DL (ref 0.1–2)
BILIRUB UR QL: NEGATIVE
BUN BLD-MCNC: 12 MG/DL (ref 7–18)
BUN/CREAT SERPL: 23.1 (ref 10–20)
CALCIUM BLD-MCNC: 8.6 MG/DL (ref 8.5–10.1)
CHLORIDE SERPL-SCNC: 112 MMOL/L (ref 98–112)
CHOLEST SERPL-MCNC: 189 MG/DL (ref ?–200)
CLARITY UR: CLEAR
CO2 SERPL-SCNC: 23 MMOL/L (ref 21–32)
CREAT BLD-MCNC: 0.52 MG/DL
DEPRECATED RDW RBC AUTO: 43.5 FL (ref 35.1–46.3)
EGFRCR SERPLBLD CKD-EPI 2021: 115 ML/MIN/1.73M2 (ref 60–?)
EOSINOPHIL # BLD AUTO: 0.12 X10(3) UL (ref 0–0.7)
EOSINOPHIL NFR BLD AUTO: 2.6 %
ERYTHROCYTE [DISTWIDTH] IN BLOOD BY AUTOMATED COUNT: 12.9 % (ref 11–15)
EST. AVERAGE GLUCOSE BLD GHB EST-MCNC: 105 MG/DL (ref 68–126)
FASTING PATIENT LIPID ANSWER: YES
FASTING STATUS PATIENT QL REPORTED: YES
GLOBULIN PLAS-MCNC: 3.4 G/DL (ref 2.8–4.4)
GLUCOSE BLD-MCNC: 92 MG/DL (ref 70–99)
GLUCOSE UR-MCNC: NORMAL MG/DL
HBA1C MFR BLD: 5.3 % (ref ?–5.7)
HCT VFR BLD AUTO: 41.1 %
HDLC SERPL-MCNC: 86 MG/DL (ref 40–59)
HGB BLD-MCNC: 13.5 G/DL
HGB UR QL STRIP.AUTO: NEGATIVE
IMM GRANULOCYTES # BLD AUTO: 0.01 X10(3) UL (ref 0–1)
IMM GRANULOCYTES NFR BLD: 0.2 %
KETONES UR-MCNC: NEGATIVE MG/DL
LDLC SERPL CALC-MCNC: 96 MG/DL (ref ?–100)
LEUKOCYTE ESTERASE UR QL STRIP.AUTO: NEGATIVE
LYMPHOCYTES # BLD AUTO: 1.93 X10(3) UL (ref 1–4)
LYMPHOCYTES NFR BLD AUTO: 42.4 %
MCH RBC QN AUTO: 30 PG (ref 26–34)
MCHC RBC AUTO-ENTMCNC: 32.8 G/DL (ref 31–37)
MCV RBC AUTO: 91.3 FL
MONOCYTES # BLD AUTO: 0.46 X10(3) UL (ref 0.1–1)
MONOCYTES NFR BLD AUTO: 10.1 %
NEUTROPHILS # BLD AUTO: 1.99 X10 (3) UL (ref 1.5–7.7)
NEUTROPHILS # BLD AUTO: 1.99 X10(3) UL (ref 1.5–7.7)
NEUTROPHILS NFR BLD AUTO: 43.8 %
NITRITE UR QL STRIP.AUTO: NEGATIVE
NONHDLC SERPL-MCNC: 103 MG/DL (ref ?–130)
OSMOLALITY SERPL CALC.SUM OF ELEC: 293 MOSM/KG (ref 275–295)
P-R INTERVAL: 168 MS
PH UR: 5.5 [PH] (ref 5–8)
PLATELET # BLD AUTO: 196 10(3)UL (ref 150–450)
POTASSIUM SERPL-SCNC: 4.1 MMOL/L (ref 3.5–5.1)
PROT SERPL-MCNC: 7.2 G/DL (ref 6.4–8.2)
PROT UR-MCNC: NEGATIVE MG/DL
Q-T INTERVAL: 410 MS
QRS DURATION: 82 MS
QTC CALCULATION (BEZET): 422 MS
R AXIS: 32 DEGREES
RBC # BLD AUTO: 4.5 X10(6)UL
SODIUM SERPL-SCNC: 142 MMOL/L (ref 136–145)
SP GR UR STRIP: 1.02 (ref 1–1.03)
T AXIS: 14 DEGREES
T4 FREE SERPL-MCNC: 0.9 NG/DL (ref 0.8–1.7)
TRIGL SERPL-MCNC: 31 MG/DL (ref 30–149)
TSI SER-ACNC: 1.58 MIU/ML (ref 0.36–3.74)
UROBILINOGEN UR STRIP-ACNC: NORMAL
VENTRICULAR RATE: 64 BPM
VLDLC SERPL CALC-MCNC: 5 MG/DL (ref 0–30)
WBC # BLD AUTO: 4.6 X10(3) UL (ref 4–11)

## 2023-10-19 PROCEDURE — 80053 COMPREHEN METABOLIC PANEL: CPT

## 2023-10-19 PROCEDURE — 80061 LIPID PANEL: CPT

## 2023-10-19 PROCEDURE — 93010 ELECTROCARDIOGRAM REPORT: CPT | Performed by: INTERNAL MEDICINE

## 2023-10-19 PROCEDURE — 36415 COLL VENOUS BLD VENIPUNCTURE: CPT

## 2023-10-19 PROCEDURE — 86480 TB TEST CELL IMMUN MEASURE: CPT

## 2023-10-19 PROCEDURE — 84443 ASSAY THYROID STIM HORMONE: CPT

## 2023-10-19 PROCEDURE — 93005 ELECTROCARDIOGRAM TRACING: CPT

## 2023-10-19 PROCEDURE — 84439 ASSAY OF FREE THYROXINE: CPT

## 2023-10-19 PROCEDURE — 85025 COMPLETE CBC W/AUTO DIFF WBC: CPT

## 2023-10-19 PROCEDURE — 81003 URINALYSIS AUTO W/O SCOPE: CPT

## 2023-10-19 PROCEDURE — 83036 HEMOGLOBIN GLYCOSYLATED A1C: CPT

## 2023-10-20 LAB
M TB IFN-G CD4+ T-CELLS BLD-ACNC: 0.13 IU/ML
M TB TUBERC IFN-G BLD QL: NEGATIVE
M TB TUBERC IGNF/MITOGEN IGNF CONTROL: >10 IU/ML
QFT TB1 AG MINUS NIL: 0.08 IU/ML
QFT TB2 AG MINUS NIL: 0.07 IU/ML

## 2024-01-02 ENCOUNTER — OFFICE VISIT (OUTPATIENT)
Dept: NEUROLOGY | Facility: CLINIC | Age: 49
End: 2024-01-02
Payer: COMMERCIAL

## 2024-01-02 VITALS
DIASTOLIC BLOOD PRESSURE: 70 MMHG | HEART RATE: 81 BPM | RESPIRATION RATE: 16 BRPM | BODY MASS INDEX: 23.18 KG/M2 | HEIGHT: 59 IN | SYSTOLIC BLOOD PRESSURE: 100 MMHG | WEIGHT: 115 LBS

## 2024-01-02 DIAGNOSIS — M54.2 NECK PAIN: Primary | ICD-10-CM

## 2024-01-02 DIAGNOSIS — R20.2 NUMBNESS AND TINGLING IN BOTH HANDS: ICD-10-CM

## 2024-01-02 DIAGNOSIS — R20.0 NUMBNESS AND TINGLING IN BOTH HANDS: ICD-10-CM

## 2024-01-02 PROCEDURE — 99204 OFFICE O/P NEW MOD 45 MIN: CPT | Performed by: OTHER

## 2024-01-02 PROCEDURE — 3074F SYST BP LT 130 MM HG: CPT | Performed by: OTHER

## 2024-01-02 PROCEDURE — 3078F DIAST BP <80 MM HG: CPT | Performed by: OTHER

## 2024-01-02 PROCEDURE — 3008F BODY MASS INDEX DOCD: CPT | Performed by: OTHER

## 2024-01-02 RX ORDER — GABAPENTIN 100 MG/1
CAPSULE ORAL
Qty: 90 CAPSULE | Refills: 2 | Status: SHIPPED | OUTPATIENT
Start: 2024-01-02

## 2024-01-02 NOTE — H&P
[facility] New Patient / Consult Visit    Chris Weaver is a 48 year old female.                         Referring MD: Akosua Alvarez    Chief Complaint   Patient presents with    Neurologic Problem     Referred by PCP, C/O BUE numbness/tingling/pins and needles/\"being electrocuted/occasional weakness/\"wakes me up at night\"       HPI:    Chris Weaver is a 48 year old, who presents for evaluation of right arm numbness.  Patient states a few months ago, she noted numbness in R arm mainly from elbow down to all fingers, with no neck pain or radiating pain from neck to hands but some weakness when trying to open small things (like jars).  This has been actually been occurring for the past year but intermittently improves on own; more recently, she notes some tingling in whole left arm and left hand; right side is worse in digits 1-2.  She denies numbness in feet of tingling in feet; notes chronic feeling of \"cold sensation\" in hands; denies dropping objects from left hand or tripping over feet or balance issues or falls.     She denies shooting pains from neck to hands when flexing forward.      She notes some improvement in right hand when flexing elbow R side when walking but worse when dropping arm at side.    She denies recent or past injury to neck.     She denies double vision, loss of vision or bowel / bladder changes and has chronic headaches / migraines; she was   Otherwise, patient denies any recent weight change, fevers, chills, nausea, double vision/ blurry vision / loss of vision, chest pain, palpitations, shortness of breath, rashes, joint pains, bowel / bladder incontinence or mood issues.     Past Medical History:   Diagnosis Date    Bilateral dry eyes 3/24/2016    Meibomian gland dysfunction (MGD), bilateral, both upper and lower lids 3/24/2016    Meibomian gland dysfunction (MGD), bilateral, both upper and lower lids 3/24/2016     Past Surgical History:   Procedure  Laterality Date    SKIN SURGERY  2005    sebaceous cyst removal between breasts     Social History     Socioeconomic History    Marital status:    Tobacco Use    Smoking status: Never     Passive exposure: Never    Smokeless tobacco: Never   Substance and Sexual Activity    Alcohol use: Yes     Comment: Social    Drug use: No   Other Topics Concern    Caffeine Concern Yes     Comment: 1/2 cup daily    Exercise Yes     Comment: walking     Family History   Problem Relation Age of Onset    Hypertension Father     Hypertension Brother     Glaucoma Brother     Diabetes Neg     Macular degeneration Neg        Allergies:  No Known Allergies   Current Meds:  Current Outpatient Medications   Medication Sig Dispense Refill    gabapentin 100 MG Oral Cap Start at 100 mg nightly x1 week, then increase to 200 mg nightly x1 week, then increase to 300 mg nightly and continue as tolerated 90 capsule 2    ibuprofen 600 MG Oral Tab Take 1 tablet (600 mg total) by mouth every 6 (six) hours as needed for Pain.      omega-3 fatty acids 1000 MG Oral Cap Take 1,000 mg by mouth daily.      diphenhydrAMINE HCl, Sleep, (SLEEP AID) 25 MG Oral Cap Take by mouth.      Trolamine Salicylate (ASPERCREME ORIGINAL EX) Apply topically.      Ergocalciferol (VITAMIN D OR) Take by mouth.      Cyanocobalamin (VITAMIN B 12 OR) Take by mouth.      ZINC OR Use as directed in the mouth or throat.      Multiple Vitamin (MULTI-VITAMIN) Oral Tab Take 1 tablet by mouth daily.      acetaminophen 500 MG Oral Tab Take 1 tablet (500 mg total) by mouth every 6 (six) hours as needed for Pain.      Vitamin C 500 MG Oral Tab Take 1 tablet (500 mg total) by mouth daily.      folic acid (FOLVITE) 400 MCG Oral Tab Take 1 tablet (400 mcg total) by mouth daily.            ROS:   A comprehensive 10 point review of systems was completed.  Pertinent positives and negatives noted in the the HPI.      PHYSICAL EXAM:   /70 (BP Location: Left arm, Patient Position:  Sitting, Cuff Size: adult)   Pulse 81   Resp 16   Ht 59\"   Wt 115 lb (52.2 kg)   LMP 10/26/2023 (Approximate)   BMI 23.23 kg/m²   Estimated body mass index is 23.23 kg/m² as calculated from the following:    Height as of this encounter: 59\".    Weight as of this encounter: 115 lb (52.2 kg).    GENERAL: well developed, well nourished, in no apparent distress  SKIN: no rashes  EYES: sclera anicteric, conjunctiva normal  HEENT: normocephalic  CARDIOVASCULAR: S1, S2 normal, RRR  LUNGS: clear to auscultation bilaterally  EXTREMITIES: no cyanosis, peripheral pulses intact    Neck: Supple; full range of motion; no carotid bruits    Mental status:  Alert and oriented to time, place, person, and situation  Speech: fluent  Language: normal naming, repetition, and comprehension  Memory: normal  Attention/concentration: normal    Fundoscopic Exam: optic discs sharp bilaterally    Cranial Nerves: II through XII  Optic:    Pupils: equally round and reactive to light with direct and consensual responses, normal accomodation   Visual acuity: Normal              Visual fields: Normal  Oculomotor/Trochlear/Abducens:    Eye Movements: EOMI without nystagmus  Trigeminal:   Facial sensation:intact to light touch bilaterally  Facial:   Smile symmetric, eyebrow raise symmetric  Vestibulocochlear:   Hearing: normal bilaterally  Glossopharyngeal/Vagus:   Palate elevates symmetrically with midline uvula  Spinal accessory:   Shoulder Shrug: normal bilaterally   Lateral head turn: normal bilaterally  Hypoglossal:   Tongue movement: protrusion is midline with normal lateral movements    Motor System:  Strength: 5/5 throughout  Tone: normal    Sensory:  Pin is normal  Vibration is normal  Proprioception is normal  Romberg is absent    Coordination:  Finger to nose normal bilaterally  Rapid alternating movements normal bilaterally  Heel to shin is normal bilaterally    DTRs:   2+, symmetric, throughout, toes downgoing bilaterally; no  clonus          Gait:  Normal casual, heel, toe and tandem gait    TEST RESULTS/DATA REVIEWED:   Labs    Component      Latest Ref [facility] 10/19/2023   WBC      4.0 - 11.0 x10(3) uL 4.6    RBC      3.80 - 5.30 x10(6)uL 4.50    Hemoglobin      12.0 - 16.0 g/dL 13.5    Hematocrit      35.0 - 48.0 % 41.1    MCV      80.0 - 100.0 fL 91.3    MCH      26.0 - 34.0 pg 30.0    MCHC      31.0 - 37.0 g/dL 32.8    RDW-SD      35.1 - 46.3 fL 43.5    RDW      11.0 - 15.0 % 12.9    Platelet Count      150.0 - 450.0 10(3)uL 196.0    Prelim Neutrophil Abs      1.50 - 7.70 x10 (3) uL 1.99    Neutrophils Absolute      1.50 - 7.70 x10(3) uL 1.99    Lymphocytes Absolute      1.00 - 4.00 x10(3) uL 1.93    Monocytes Absolute      0.10 - 1.00 x10(3) uL 0.46    Eosinophils Absolute      0.00 - 0.70 x10(3) uL 0.12    Basophils Absolute      0.00 - 0.20 x10(3) uL 0.04    Immature Granulocyte Absolute      0.00 - 1.00 x10(3) uL 0.01    Neutrophils %      % 43.8    Lymphocytes %      % 42.4    Monocytes %      % 10.1    Eosinophils %      % 2.6    Basophils %      % 0.9    Immature Granulocyte %      % 0.2    Glucose      70 - 99 mg/dL 92    Sodium      136 - 145 mmol/L 142    Potassium      3.5 - 5.1 mmol/L 4.1    Chloride      98 - 112 mmol/L 112    Carbon Dioxide, Total      21.0 - 32.0 mmol/L 23.0    ANION GAP      0 - 18 mmol/L 7    BUN      7 - 18 mg/dL 12    CREATININE      0.55 - 1.02 mg/dL 0.52 (L)    BUN/CREATININE RATIO      10.0 - 20.0  23.1 (H)    CALCIUM      8.5 - 10.1 mg/dL 8.6    CALCULATED OSMOLALITY      275 - 295 mOsm/kg 293    EGFR      >=60 mL/min/1.73m2 115    ALT (SGPT)      13 - 56 U/L 27    AST (SGOT)      15 - 37 U/L 17    ALKALINE PHOSPHATASE      39 - 100 U/L 53    Total Bilirubin      0.1 - 2.0 mg/dL 0.6    PROTEIN, TOTAL      6.4 - 8.2 g/dL 7.2    Albumin      3.4 - 5.0 g/dL 3.8    Globulin      2.8 - 4.4 g/dL 3.4    A/G Ratio      1.0 - 2.0  1.1    Patient Fasting for CMP? Yes    HEMOGLOBIN A1c      <5.7 % 5.3     ESTIMATED AVERAGE GLUCOSE      68 - 126 mg/dL 105    T4,Free (Direct)      0.8 - 1.7 ng/dL 0.9    TSH      0.358 - 3.740 mIU/mL 1.580       Legend:  (L) Low  (H) High    Imaging:  None       IMPRESSION AND PLAN:   Chris Weaver is a 48 year old female  who presents for evaluation of right arm numbness.  Patient states a few months ago, she noted numbness in R arm mainly from elbow down to all fingers, with no neck pain or radiating pain from neck to hands but some weakness when trying to open small things (like jars).  This has been actually been occurring for the past year but intermittently improves on own; more recently, she notes some tingling in whole left arm and left hand; right side is worse in digits 1-2.  She denies numbness in feet of tingling in feet; notes chronic feeling of \"cold sensation\" in hands; denies dropping objects from left hand or tripping over feet or balance issues or falls.     She denies shooting pains from neck to hands when flexing forward.      Exam is overall normal but given her symptoms, differential includes multiple entrapment neuropathies vs cervical radiculopathy vs non-neurologic etiology. For treatment of numbness and tingling with worsening symptoms at night, recommend start gabapentin - Start at 100 mg nightly x1 week, then increase to 200 mg nightly x1 week, then increase to 300 mg nightly with plan to increase further as tolerated / needed; patient was advised of potential side effects, including but not limited to rash, excessive sedation, weight gain and/or peripheral edema; patient was advised to notify office with any new or worsening symptoms.      1. Neck pain  As noted above   - [facility] ([facility] WARRDANG[facility]O)    2. Numbness and tingling in both hands  As noted above  [facility]  - gabapentin 100 MG Oral Cap; Start at 100 mg nightly x1 week, then increase to 200 mg nightly x1 week, then increase to 300 mg nightly and continue as tolerated  Dispense: 90  capsule; Refill: 2    Return in about 3 months (around 4/2/2024).    Copy of note was sent to referring physician.      Suraj Anthony MD, Neurology  [facility]  Pager [phone]  1/2/2024

## 2024-02-06 ENCOUNTER — PROCEDURE VISIT (OUTPATIENT)
Dept: NEUROLOGY | Facility: CLINIC | Age: 49
End: 2024-02-06
Payer: COMMERCIAL

## 2024-02-06 DIAGNOSIS — R20.0 NUMBNESS AND TINGLING IN BOTH HANDS: Primary | ICD-10-CM

## 2024-02-06 DIAGNOSIS — R20.2 NUMBNESS AND TINGLING IN BOTH HANDS: Primary | ICD-10-CM

## 2024-02-06 DIAGNOSIS — M54.2 NECK PAIN: ICD-10-CM

## 2024-02-06 NOTE — PROCEDURES
[facility]  [address], [place] [address]   [phone]        Full Name: EKTA MAY Gender: Female  Patient ID: [id number] YOB: 1975      Visit Date: 2/6/2024 8:59 AM  Age: 48 Years  Examining Physician: MAGGIE IBARRA MD  Height: 4 feet 11 inch  Weight: 115 lbs  History:     Focused HPI:   This is a 48 year old female  who presents for evaluation of right arm numbness.  Patient states a few months ago, she noted numbness in R arm mainly from elbow down to all fingers, with no neck pain or radiating pain from neck to hands but some weakness when trying to open small things (like jars).  This has been actually been occurring for the past year but intermittently improves on own; more recently, she notes some tingling in whole left arm and left hand; right side is worse in digits 1-2.  She denies numbness in feet of tingling in feet; notes chronic feeling of \"cold sensation\" in hands; denies dropping objects from left hand or tripping over feet or balance issues or falls.     She denies shooting pains from neck to hands when flexing forward.      Exam is overall normal but given her symptoms, differential includes multiple entrapment neuropathies vs cervical radiculopathy vs non-neurologic etiology; NCS/EMG requested to further evaluate.    Focused Exam:  Motor: 5/5 strength throughout  DTR: 2+, symmetric, throughout, toes downgoing bilaterally; no clonus   Sensory: pin is normal; vibration is normal    Sensory NCS      Nerve / Sites Rec. Site Onset Lat Peak Lat NP Amp PP Amp Segments Distance Peak Diff Velocity Comment     ms ms µV µV  cm ms m/s    R Median - Dig II (Antidromic)      Wrist Index 4.01 4.90 47.0 90.7 Wrist - Index 14  35       Ref.  ?3.30 ?4.00 ?17.0 ?19.0 Ref.       L Median - Dig II (Antidromic)      Wrist Index 2.81 3.70 62.4 103.8 Wrist - Index 13  46       Ref.  ?3.30 ?4.00 ?17.0 ?19.0 Ref.       R Ulnar - Dig V  (Antidromic)      Wrist Dig V 2.08 2.92 84.7 128.5 Wrist - Dig V 11  53       Ref.  ?3.10 ?4.00 ?14.0 ?13.0 Ref.       L Ulnar - Dig V (Antidromic)      Wrist Dig V 1.93 2.60 65.5 88.2 Wrist - Dig V 11  57       Ref.  ?3.10 ?4.00 ?14.0 ?13.0 Ref.       R Radial - Superficial (Antidromic)      Forearm Wrist 1.88 2.50 51.8 72.8 Forearm - Wrist 10  53       Ref.  ?2.20 ?2.80 ?7.0 ?11.0 Ref.          2 Wrist 1.93 2.50 51.5 76.5        L Radial - Superficial (Antidromic)      Forearm Wrist 1.67 2.19 64.0 64.4 Forearm - Wrist 10  60       Ref.  ?2.20 ?2.80 ?7.0 ?11.0 Ref.       R Median, Ulnar - Transcarpal comparison      Median Palm Wrist 2.40 3.23 26.1 23.1 Median Palm - Wrist 8  33       Ulnar Palm Wrist 1.30 1.82 33.3 36.3 Ulnar Palm - Wrist 8  61          Median Palm - Ulnar Palm  1.41           Ref.  ?0.40     L Median, Ulnar - Transcarpal comparison      Median Palm Wrist 1.93 2.55 26.3 25.8 Median Palm - Wrist 8  42       Ulnar Palm Wrist 1.25 1.77 28.7 36.5 Ulnar Palm - Wrist 8  64          Median Palm - Ulnar Palm  0.78           Ref.  ?0.40         Motor NCS      Nerve / Sites Muscle Latency Amplitude Segments Dist. Lat Diff Velocity Comments     ms mV  cm ms m/s    R Median - APB      Wrist APB 4.69 10.5 Wrist - APB 7         Ref.  ?4.40 ?4.2 Ref.          Elbow APB 8.29 10.4 Elbow - Wrist 19.5 3.60 54.1       Ref.    Ref.   ?51.0    L Median - APB      Wrist APB 3.75 10.8 Wrist - APB 7         Ref.  ?4.40 ?4.2 Ref.          Elbow APB 7.17 10.2 Elbow - Wrist 20 3.42 58.5       Ref.    Ref.   ?51.0    R Ulnar - ADM      Wrist ADM 2.73 9.6 Wrist - ADM 7         Ref.  ?3.70 ?7.9 Ref.          B.Elbow ADM 5.42 9.8 B.Elbow - Wrist 17 2.69 63.3       Ref.    Ref.   ?52.0       A.Elbow ADM 7.23 8.9 A.Elbow - B.Elbow 10 1.81 55.2       Ref.    Ref.   ?43.0    L Ulnar - ADM      Wrist ADM 2.50 10.2 Wrist - ADM 7         Ref.  ?3.70 ?7.9 Ref.          B.Elbow ADM 5.08 10.2 B.Elbow - Wrist 16 2.58 61.9       Ref.     Ref.   ?52.0       A.Elbow ADM 6.83 9.1 A.Elbow - B.Elbow 10 1.75 57.1       Ref.    Ref.   ?43.0        F  Wave      Nerve M Latency F Latency    ms ms   R Median - APB 5.3 26.0   Ref.  ?25.7   R Ulnar - ADM 3.1 25.0   Ref.  ?26.1   L Median - APB 4.0 24.8   Ref.  ?25.7   L Ulnar - ADM 2.2 23.3   Ref.  ?26.1       EMG Summary Table     Spontaneous MUAP Recruitment   Muscle IA Fib PSW Fasc H.F. Amp Dur. PPP Pattern   L. Deltoid N None None None None N N N N   L. Biceps brachii N None None None None N N N N   L. Triceps brachii N None None None None N N N N   L. Extensor digitorum communis N None None None None N N N N   L. First dorsal interosseous N None None None None N N N N   L. Abductor pollicis brevis N None None None None N N N N   R. Deltoid N None None None None N N N N   R. Biceps brachii N None None None None N N N N   R. Triceps brachii N None None None None N N N N   R. Extensor digitorum communis N None None None None N N N N   R. First dorsal interosseous N None None None None N N N N   R. Abductor pollicis brevis N None None None None N N N N       Summary    The motor conduction test was performed on 4 nerve(s). The results were normal in 3 nerve(s): L Median - APB, R Ulnar - ADM, L Ulnar - ADM. Results outside the specified normal range were found in 1 nerve(s), as follows:  In the R Median - APB study  the take off latency result was increased for Wrist stimulation    The sensory conduction test was performed on 8 nerve(s). The results were normal in 5 nerve(s): L Median - Dig II (Antidromic), R Ulnar - Dig V (Antidromic), L Ulnar - Dig V (Antidromic), R Radial - Superficial (Antidromic), L Radial - Superficial (Antidromic). Findings were unremarkable in 2 nerve(s): R Median, Ulnar - Transcarpal comparison, L Median, Ulnar - Transcarpal comparison. Results outside the specified normal range were found in 1 nerve(s), as follows:  In the R Median - Dig II (Antidromic) study  the peak latency result  was increased for Wrist stimulation    The F wave study was unremarkable in all 4 of the tested nerves: R Median - APB, R Ulnar - ADM, L Median - APB, L Ulnar - ADM    The needle EMG study was normal in all 12 tested muscles: L. Deltoid, L. Biceps brachii, L. Triceps brachii, L. Extensor digitorum communis, L. First dorsal interosseous, L. Abductor pollicis brevis, R. Deltoid, R. Biceps brachii, R. Triceps brachii, R. Extensor digitorum communis, R. First dorsal interosseous, R. Abductor pollicis brevis.            Conclusion:   This is an abnormal study. 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   This involves sensory fibers with demyelinating features with no motor involvement or secondary denervation.  This is more severe on the right than the left side. In the correct clinical context, this would be consistent with a mild to moderate carpal tunnel syndrome on the right side and a mild carpal tunnel syndrome on the left side.     Suraj Anthony MD, Neurology  [facility]  Pager [phone]  2/6/2024

## 2024-04-12 ENCOUNTER — OFFICE VISIT (OUTPATIENT)
Dept: NEUROLOGY | Facility: CLINIC | Age: 49
End: 2024-04-12
Payer: COMMERCIAL

## 2024-04-12 VITALS
BODY MASS INDEX: 23.18 KG/M2 | SYSTOLIC BLOOD PRESSURE: 101 MMHG | HEART RATE: 64 BPM | RESPIRATION RATE: 18 BRPM | WEIGHT: 115 LBS | DIASTOLIC BLOOD PRESSURE: 64 MMHG | OXYGEN SATURATION: 99 % | HEIGHT: 59 IN

## 2024-04-12 DIAGNOSIS — M54.12 CERVICAL RADICULAR PAIN: ICD-10-CM

## 2024-04-12 DIAGNOSIS — R20.0 RIGHT ARM NUMBNESS: Primary | ICD-10-CM

## 2024-04-12 DIAGNOSIS — R20.2 NUMBNESS AND TINGLING IN BOTH HANDS: ICD-10-CM

## 2024-04-12 DIAGNOSIS — G56.03 BILATERAL CARPAL TUNNEL SYNDROME: ICD-10-CM

## 2024-04-12 DIAGNOSIS — R20.0 NUMBNESS AND TINGLING IN BOTH HANDS: ICD-10-CM

## 2024-04-12 PROCEDURE — 99214 OFFICE O/P EST MOD 30 MIN: CPT | Performed by: OTHER

## 2024-04-12 RX ORDER — GABAPENTIN 300 MG/1
300 CAPSULE ORAL 3 TIMES DAILY
Qty: 90 CAPSULE | Refills: 2 | Status: SHIPPED | OUTPATIENT
Start: 2024-04-12

## 2024-04-12 NOTE — PROGRESS NOTES
[facility] Progress Note    HPI  Chief Complaint   Patient presents with    Neuropathy     / f/u 3 months bilateral arms pain. Stats that she is still having pain        As per my initial H&P from 1/2/2024,   \" Chris Weaver is a 48 year old, who presents for evaluation of right arm numbness.  Patient states a few months ago, she noted numbness in R arm mainly from elbow down to all fingers, with no neck pain or radiating pain from neck to hands but some weakness when trying to open small things (like jars).  This has been actually been occurring for the past year but intermittently improves on own; more recently, she notes some tingling in whole left arm and left hand; right side is worse in digits 1-2.  She denies numbness in feet of tingling in feet; notes chronic feeling of \"cold sensation\" in hands; denies dropping objects from left hand or tripping over feet or balance issues or falls.     She denies shooting pains from neck to hands when flexing forward.      She notes some improvement in right hand when flexing elbow R side when walking but worse when dropping arm at side.    She denies recent or past injury to neck.     She denies double vision, loss of vision or bowel / bladder changes and has chronic headaches / migraines; she was   Otherwise, patient denies any recent weight change, fevers, chills, nausea, double vision/ blurry vision / loss of vision, chest pain, palpitations, shortness of breath, rashes, joint pains, bowel / bladder incontinence or mood issues. \"      Patient since last visit temporarily noted improvement in tingling in hands when she started taking gabapentin 300 mg nightly; however, in the past month, she has been having numbness in right arm from shoulder down to all fingers not clearly associated with position but having more pain at night time in same area; she denies neck pain and denies dropping objects from hands; no balance issues or falls or vision  changes or vertigo; no new other numbness/tingling/balance issues.     Past Medical History:    Bilateral dry eyes    Meibomian gland dysfunction (MGD), bilateral, both upper and lower lids    Meibomian gland dysfunction (MGD), bilateral, both upper and lower lids     Past Surgical History:   Procedure Laterality Date    Skin surgery  2005    sebaceous cyst removal between breasts     Family History   Problem Relation Age of Onset    Hypertension Father     Hypertension Brother     Glaucoma Brother     Diabetes Neg     Macular degeneration Neg      Social History     Socioeconomic History    Marital status:    Tobacco Use    Smoking status: Never     Passive exposure: Never    Smokeless tobacco: Never   Substance and Sexual Activity    Alcohol use: Yes     Comment: Social    Drug use: No   Other Topics Concern    Caffeine Concern Yes     Comment: 1/2 cup daily    Exercise Yes     Comment: walking       No Known Allergies      Current Outpatient Medications:     gabapentin 300 MG Oral Cap, Take 1 capsule (300 mg total) by mouth 3 (three) times daily. Start at 300 mg bid for 1 week, then increase to 300 mg tid and continue as tolerated, Disp: 90 capsule, Rfl: 2    ibuprofen 600 MG Oral Tab, Take 1 tablet (600 mg total) by mouth every 6 (six) hours as needed for Pain., Disp: , Rfl:     omega-3 fatty acids 1000 MG Oral Cap, Take 1,000 mg by mouth daily., Disp: , Rfl:     diphenhydrAMINE HCl, Sleep, (SLEEP AID) 25 MG Oral Cap, Take by mouth., Disp: , Rfl:     Trolamine Salicylate (ASPERCREME ORIGINAL EX), Apply topically., Disp: , Rfl:     Ergocalciferol (VITAMIN D OR), Take by mouth., Disp: , Rfl:     Cyanocobalamin (VITAMIN B 12 OR), Take by mouth., Disp: , Rfl:     ZINC OR, Use as directed in the mouth or throat., Disp: , Rfl:     Multiple Vitamin (MULTI-VITAMIN) Oral Tab, Take 1 tablet by mouth daily., Disp: , Rfl:     acetaminophen 500 MG Oral Tab, Take 1 tablet (500 mg total) by mouth every 6 (six) hours as  needed for Pain., Disp: , Rfl:     Vitamin C 500 MG Oral Tab, Take 1 tablet (500 mg total) by mouth daily., Disp: , Rfl:     folic acid (FOLVITE) 400 MCG Oral Tab, Take 1 tablet (400 mcg total) by mouth daily., Disp: , Rfl:     Review of Systems:  No chest pain or palpitations; otherwise as noted in HPI.    Exam:  /64   Pulse 64   Resp 18   Ht 59\"   Wt 115 lb (52.2 kg)   LMP 10/26/2023 (Approximate)   SpO2 99%   BMI 23.23 kg/m²   Estimated body mass index is 23.23 kg/m² as calculated from the following:    Height as of this encounter: 59\".    Weight as of this encounter: 115 lb (52.2 kg).    Gen: well developed, well nourished, no acute distress  HEENT: normocephalic  Heart; normal S1/S2, regular rate and rhythm  Lungs: clear to auscultation bilaterally  Extremities: no edema, peripheral pulses intact    Neck: supple, full range of motion; no carotid bruits    Fundoscopic Exam: optic discs sharp bilaterally    Neuro:  Mental status:  Orientation: Alert and oriented to person, place, time  Speech Fluent and conversational    CN: PERRL, EOMI with no nystagmus, VFF, smile symmetric, sensation intact, tongue and palate midline, SCM intact, otherwise, CN 2-12 intact  Motor: 5/5 strength throughout, tone normal  DTR: 1+ symmetric throughout UE, 2+ in patellar absent   R ankle jerk;, toes downgoing bilaterally, no clonus  Sensory: intact to light touch, pin, vibration and proprioception throughout  Coord: FNF intact with no tremor or dysmetria; rapid alternating movements intact bilaterally  Romberg: absent  Gait: normal casual, heel, toe and tandem gait    Labs:  None new    Imaging:  None new    Other procedures:    New    NCS/EMG 2/6/2024):   Sensory NCS      Nerve / Sites Rec. Site Onset Lat Peak Lat NP Amp PP Amp Segments Distance Peak Diff Velocity Comment       ms ms µV µV   cm ms m/s     R Median - Dig II (Antidromic)      Wrist Index 4.01 4.90 47.0 90.7 Wrist - Index 14   35        Ref.   ?3.30 ?4.00  ?17.0 ?19.0 Ref.           L Median - Dig II (Antidromic)      Wrist Index 2.81 3.70 62.4 103.8 Wrist - Index 13   46        Ref.   ?3.30 ?4.00 ?17.0 ?19.0 Ref.           R Ulnar - Dig V (Antidromic)      Wrist Dig V 2.08 2.92 84.7 128.5 Wrist - Dig V 11   53        Ref.   ?3.10 ?4.00 ?14.0 ?13.0 Ref.           L Ulnar - Dig V (Antidromic)      Wrist Dig V 1.93 2.60 65.5 88.2 Wrist - Dig V 11   57        Ref.   ?3.10 ?4.00 ?14.0 ?13.0 Ref.           R Radial - Superficial (Antidromic)      Forearm Wrist 1.88 2.50 51.8 72.8 Forearm - Wrist 10   53        Ref.   ?2.20 ?2.80 ?7.0 ?11.0 Ref.              2 Wrist 1.93 2.50 51.5 76.5             L Radial - Superficial (Antidromic)      Forearm Wrist 1.67 2.19 64.0 64.4 Forearm - Wrist 10   60        Ref.   ?2.20 ?2.80 ?7.0 ?11.0 Ref.           R Median, Ulnar - Transcarpal comparison      Median Palm Wrist 2.40 3.23 26.1 23.1 Median Palm - Wrist 8   33        Ulnar Palm Wrist 1.30 1.82 33.3 36.3 Ulnar Palm - Wrist 8   61                 Median Palm - Ulnar Palm   1.41                   Ref.   ?0.40       L Median, Ulnar - Transcarpal comparison      Median Palm Wrist 1.93 2.55 26.3 25.8 Median Palm - Wrist 8   42        Ulnar Palm Wrist 1.25 1.77 28.7 36.5 Ulnar Palm - Wrist 8   64                 Median Palm - Ulnar Palm   0.78                   Ref.   ?0.40           Motor NCS      Nerve / Sites Muscle Latency Amplitude Segments Dist. Lat Diff Velocity Comments       ms mV   cm ms m/s     R Median - APB      Wrist APB 4.69 10.5 Wrist - APB 7            Ref.   ?4.40 ?4.2 Ref.              Elbow APB 8.29 10.4 Elbow - Wrist 19.5 3.60 54.1        Ref.       Ref.     ?51.0     L Median - APB      Wrist APB 3.75 10.8 Wrist - APB 7            Ref.   ?4.40 ?4.2 Ref.              Elbow APB 7.17 10.2 Elbow - Wrist 20 3.42 58.5        Ref.       Ref.     ?51.0     R Ulnar - ADM      Wrist ADM 2.73 9.6 Wrist - ADM 7            Ref.   ?3.70 ?7.9 Ref.              B.Elbow ADM 5.42  9.8 B.Elbow - Wrist 17 2.69 63.3        Ref.       Ref.     ?52.0        A.Elbow ADM 7.23 8.9 A.Elbow - B.Elbow 10 1.81 55.2        Ref.       Ref.     ?43.0     L Ulnar - ADM      Wrist ADM 2.50 10.2 Wrist - ADM 7            Ref.   ?3.70 ?7.9 Ref.              B.Elbow ADM 5.08 10.2 B.Elbow - Wrist 16 2.58 61.9        Ref.       Ref.     ?52.0        A.Elbow ADM 6.83 9.1 A.Elbow - B.Elbow 10 1.75 57.1        Ref.       Ref.     ?43.0         F  Wave      Nerve M Latency F Latency     ms ms   R Median - APB 5.3 26.0   Ref.   ?25.7   R Ulnar - ADM 3.1 25.0   Ref.   ?26.1   L Median - APB 4.0 24.8   Ref.   ?25.7   L Ulnar - ADM 2.2 23.3   Ref.   ?26.1                   EMG Summary Table       Spontaneous MUAP Recruitment   Muscle IA Fib PSW Fasc H.F. Amp Dur. PPP Pattern   L. Deltoid N None None None None N N N N   L. Biceps brachii N None None None None N N N N   L. Triceps brachii N None None None None N N N N   L. Extensor digitorum communis N None None None None N N N N   L. First dorsal interosseous N None None None None N N N N   L. Abductor pollicis brevis N None None None None N N N N   R. Deltoid N None None None None N N N N   R. Biceps brachii N None None None None N N N N   R. Triceps brachii N None None None None N N N N   R. Extensor digitorum communis N None None None None N N N N   R. First dorsal interosseous N None None None None N N N N   R. Abductor pollicis brevis N None None None None N N N N       Summary     The motor conduction test was performed on 4 nerve(s). The results were normal in 3 nerve(s): L Median - APB, R Ulnar - ADM, L Ulnar - ADM. Results outside the specified normal range were found in 1 nerve(s), as follows:  In the R Median - APB study  the take off latency result was increased for Wrist stimulation     The sensory conduction test was performed on 8 nerve(s). The results were normal in 5 nerve(s): L Median - Dig II (Antidromic), R Ulnar - Dig V (Antidromic), L Ulnar - Dig V  (Antidromic), R Radial - Superficial (Antidromic), L Radial - Superficial (Antidromic). Findings were unremarkable in 2 nerve(s): R Median, Ulnar - Transcarpal comparison, L Median, Ulnar - Transcarpal comparison. Results outside the specified normal range were found in 1 nerve(s), as follows:  In the R Median - Dig II (Antidromic) study  the peak latency result was increased for Wrist stimulation     The F wave study was unremarkable in all 4 of the tested nerves: R Median - APB, R Ulnar - ADM, L Median - APB, L Ulnar - ADM     The needle EMG study was normal in all 12 tested muscles: L. Deltoid, L. Biceps brachii, L. Triceps brachii, L. Extensor digitorum communis, L. First dorsal interosseous, L. Abductor pollicis brevis, R. Deltoid, R. Biceps brachii, R. Triceps brachii, R. Extensor digitorum communis, R. First dorsal interosseous, R. Abductor pollicis brevis.              Conclusion:   This is an abnormal study. 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   This involves sensory fibers with demyelinating features with no motor involvement or secondary denervation.  This is more severe on the right than the left side. In the correct clinical context, this would be consistent with a mild to moderate carpal tunnel syndrome on the right side and a mild carpal tunnel syndrome on the left side.            Impression/ Plan:  Chris Weaver is a 49 year old female, who originally presented 1/2/2024 for evaluation of right arm numbness. Patient states a few months ago, she noted numbness in R arm mainly from elbow down to all fingers, with no neck pain or radiating pain from neck to hands but some weakness when trying to open small things (like jars).  This has been actually been occurring for the past year but intermittently improves on own; more recently, she notes some tingling in whole left arm and left hand; right side is worse in digits  1-2.  NCS/EMG done showed no large fiber polyneuropathy only showing bilateral median entrapment neuropathy, Right worse than Left but her symptoms seem more consistent with cervical radiculopathy. However, given transient recurrent nature of her numbness without clear positional component with negative peripheral workup, will check MRI cervical spine to evaluate for possible myelopathy / intramedullary pathology.   Otherwise, will increase gabapentin from 300 mg nightly to 300 mg tid as tolerated; will check ESR, CRP, RF, B12 and EM as bilateral median entrapment neuropathy could be due to connective tissue disorder.     1. Right arm numbness  As noted above   - z [employer] MRI SPINE CERVICAL (W+WO) (CPT=72156); Future  - z [employer] MRI SPINE CERVICAL (W+WO) (CPT=72156)  - C-Reactive Protein; Future  - Rheumatoid Arthritis Factor; Future  - Sed Rate, Westergren (Automated); Future  - Vitamin B12; Future  - Connective Tissue Disease (EM) Screen, Reflex Specific Antibody; Future    2. Cervical radicular pain  As noted above   - z [employer] MRI SPINE CERVICAL (W+WO) (CPT=72156); Future  - z [employer] MRI SPINE CERVICAL (W+WO) (CPT=72156)  - C-Reactive Protein; Future  - Rheumatoid Arthritis Factor; Future  - Sed Rate, Westergren (Automated); Future  - Vitamin B12; Future  - Connective Tissue Disease (EM) Screen, Reflex Specific Antibody; Future    3. Bilateral carpal tunnel syndrome  As noted above   - C-Reactive Protein; Future  - Rheumatoid Arthritis Factor; Future  - Sed Rate, Westergren (Automated); Future  - Vitamin B12; Future  - Connective Tissue Disease (EM) Screen, Reflex Specific Antibody; Future    4. Numbness and tingling in both hands  As noted above   - gabapentin 300 MG Oral Cap; Take 1 capsule (300 mg total) by mouth 3 (three) times daily. Start at 300 mg bid for 1 week, then increase to 300 mg tid and continue as tolerated  Dispense: 90 capsule; Refill: 2    No follow-ups on file.    Suraj Anthony MD,  Neurology  [facility]  Pager [phone]  4/12/2024

## 2024-04-12 NOTE — PATIENT INSTRUCTIONS
Refill policies:    Allow 2-3 business days for refills; controlled substances may take longer.  Contact your pharmacy at least 5 days prior to running out of medication and have them send an electronic request or submit request through the “request refill” option in your [employer] account.  Refills are not addressed on weekends; covering physicians do not authorize routine medications on weekends.  No narcotics or controlled substances are refilled after noon on Fridays or by on call physicians.  By law, narcotics must be electronically prescribed.  A 30 day supply with no refills is the maximum allowed.  If your prescription is due for a refill, you may be due for a follow up appointment.  To best provide you care, patients receiving routine medications need to be seen at least once a year.  Patients receiving narcotic/controlled substance medications need to be seen at least once every 3 months.  In the event that your preferred pharmacy does not have the requested medication in stock (e.g. Backordered), it is your responsibility to find another pharmacy that has the requested medication available.  We will gladly send a new prescription to that pharmacy at your request.    Scheduling Tests:    If your physician has ordered radiology tests such as MRI or CT scans, please contact Central Scheduling at [phone] right away to schedule the test.  Once scheduled, the [facility] Centralized Referral Team will work with your insurance carrier to obtain pre-certification or prior authorization.  Depending on your insurance carrier, approval may take 3-10 days.  It is highly recommended patients assure they have received an authorization before having a test performed.  If test is done without insurance authorization, patient may be responsible for the entire amount billed.      Precertification and Prior Authorizations:  If your physician has recommended that you have a procedure or additional testing performed the [facility]  Centralized Referral Team will contact your insurance carrier to obtain pre-certification or prior authorization.    You are strongly encouraged to contact your insurance carrier to verify that your procedure/test has been approved and is a COVERED benefit.  Although the [facility] Centralized Referral Team does its due diligence, the insurance carrier gives the disclaimer that \"Although the procedure is authorized, this does not guarantee payment.\"    Ultimately the patient is responsible for payment.   Thank you for your understanding in this matter.  Paperwork Completion:  If you require FMLA or disability paperwork for your recovery, please make sure to either drop it off or have it faxed to our office at [phone]. Be sure the form has your name and date of birth on it.  The form will be faxed to our Forms Department and they will complete it for you.  There is a 25$ fee for all forms that need to be filled out.  Please be aware there is a 10-14 day turnaround time.  You will need to sign a release of information (CHAPIN) form if your paperwork does not come with one.  You may call the Forms Department with any questions at [phone].  Their fax number is [phone].

## 2024-05-03 ENCOUNTER — LAB ENCOUNTER (OUTPATIENT)
Dept: LAB | Facility: HOSPITAL | Age: 49
End: 2024-05-03
Attending: Other
Payer: COMMERCIAL

## 2024-05-03 DIAGNOSIS — R20.0 RIGHT ARM NUMBNESS: ICD-10-CM

## 2024-05-03 DIAGNOSIS — G56.03 BILATERAL CARPAL TUNNEL SYNDROME: ICD-10-CM

## 2024-05-03 DIAGNOSIS — M54.12 CERVICAL RADICULAR PAIN: ICD-10-CM

## 2024-05-03 LAB
CRP SERPL-MCNC: <0.4 MG/DL (ref ?–1)
ERYTHROCYTE [SEDIMENTATION RATE] IN BLOOD: 12 MM/HR
RHEUMATOID FACT SERPL-ACNC: <10 IU/ML (ref ?–14)
VIT B12 SERPL-MCNC: 683 PG/ML (ref 211–911)

## 2024-05-03 PROCEDURE — 82607 VITAMIN B-12: CPT

## 2024-05-03 PROCEDURE — 86140 C-REACTIVE PROTEIN: CPT

## 2024-05-03 PROCEDURE — 86225 DNA ANTIBODY NATIVE: CPT

## 2024-05-03 PROCEDURE — 85652 RBC SED RATE AUTOMATED: CPT

## 2024-05-03 PROCEDURE — 36415 COLL VENOUS BLD VENIPUNCTURE: CPT

## 2024-05-03 PROCEDURE — 86038 ANTINUCLEAR ANTIBODIES: CPT

## 2024-05-03 PROCEDURE — 86431 RHEUMATOID FACTOR QUANT: CPT

## 2024-05-06 LAB
DSDNA IGG SERPL IA-ACNC: 1.5 IU/ML
ENA AB SER QL IA: 0.4 UG/L
ENA AB SER QL IA: NEGATIVE

## 2024-07-17 ENCOUNTER — OFFICE VISIT (OUTPATIENT)
Dept: NEUROLOGY | Facility: CLINIC | Age: 49
End: 2024-07-17
Payer: COMMERCIAL

## 2024-07-17 VITALS
RESPIRATION RATE: 18 BRPM | HEIGHT: 59 IN | WEIGHT: 115 LBS | DIASTOLIC BLOOD PRESSURE: 60 MMHG | SYSTOLIC BLOOD PRESSURE: 98 MMHG | OXYGEN SATURATION: 98 % | HEART RATE: 86 BPM | BODY MASS INDEX: 23.18 KG/M2

## 2024-07-17 DIAGNOSIS — G56.03 BILATERAL CARPAL TUNNEL SYNDROME: ICD-10-CM

## 2024-07-17 DIAGNOSIS — M62.838 MUSCLE SPASM: ICD-10-CM

## 2024-07-17 DIAGNOSIS — R20.0 RIGHT ARM NUMBNESS: ICD-10-CM

## 2024-07-17 DIAGNOSIS — M54.12 CERVICAL RADICULAR PAIN: Primary | ICD-10-CM

## 2024-07-17 PROCEDURE — 99214 OFFICE O/P EST MOD 30 MIN: CPT | Performed by: OTHER

## 2024-07-17 RX ORDER — CYCLOBENZAPRINE HCL 10 MG
10 TABLET ORAL 3 TIMES DAILY PRN
Qty: 30 TABLET | Refills: 0 | Status: SHIPPED | OUTPATIENT
Start: 2024-07-17

## 2024-07-17 NOTE — PROGRESS NOTES
[facility] – part of the [facility] Progress Note    HPI  Chief Complaint   Patient presents with    Neurologic Problem     3 month follow up patient here to discuss MRI spine  results        As per my initial H&P from 1/2/2024,   \" Chris Weaver is a 48 year old, who presents for evaluation of right arm numbness.  Patient states a few months ago, she noted numbness in R arm mainly from elbow down to all fingers, with no neck pain or radiating pain from neck to hands but some weakness when trying to open small things (like jars).  This has been actually been occurring for the past year but intermittently improves on own; more recently, she notes some tingling in whole left arm and left hand; right side is worse in digits 1-2.  She denies numbness in feet of tingling in feet; notes chronic feeling of \"cold sensation\" in hands; denies dropping objects from left hand or tripping over feet or balance issues or falls.     She denies shooting pains from neck to hands when flexing forward.      She notes some improvement in right hand when flexing elbow R side when walking but worse when dropping arm at side.    She denies recent or past injury to neck.     She denies double vision, loss of vision or bowel / bladder changes and has chronic headaches / migraines; she was   Otherwise, patient denies any recent weight change, fevers, chills, nausea, double vision/ blurry vision / loss of vision, chest pain, palpitations, shortness of breath, rashes, joint pains, bowel / bladder incontinence or mood issues. \"      Prior notes as per 4/12/2024.  Patient since last visit temporarily noted improvement in tingling in hands when she started taking gabapentin 300 mg nightly; however, in the past month, she has been having numbness in right arm from shoulder down to all fingers not clearly associated with position but having more pain at night time in same area; she denies neck pain and denies dropping objects from hands; no balance  issues or falls or vision changes or vertigo; no new other numbness/tingling/balance issues.        Patient last seen 4/12/2024. She notes tingling and numbness on right side when lying on her side or when putting arms down - she denies weakness or dropping objects from hands. She has been taking gabapentin but only taking at night rather than 3 times daily. She denies balance issues, falls or new focal numbness/tingling/weakness.     Past Medical History:    Bilateral dry eyes    Meibomian gland dysfunction (MGD), bilateral, both upper and lower lids    Meibomian gland dysfunction (MGD), bilateral, both upper and lower lids     Past Surgical History:   Procedure Laterality Date    Skin surgery  2005    sebaceous cyst removal between breasts     Family History   Problem Relation Age of Onset    Hypertension Father     Hypertension Brother     Glaucoma Brother     Diabetes Neg     Macular degeneration Neg      Social History     Socioeconomic History    Marital status:    Tobacco Use    Smoking status: Never     Passive exposure: Never    Smokeless tobacco: Never   Substance and Sexual Activity    Alcohol use: Yes     Comment: Social    Drug use: No   Other Topics Concern    Caffeine Concern Yes     Comment: 1/2 cup daily    Exercise Yes     Comment: walking       No Known Allergies      Current Outpatient Medications:     cyclobenzaprine 10 MG Oral Tab, Take 1 tablet (10 mg total) by mouth 3 (three) times daily as needed for Muscle spasms., Disp: 30 tablet, Rfl: 0    gabapentin 300 MG Oral Cap, Take 1 capsule (300 mg total) by mouth 3 (three) times daily. Start at 300 mg bid for 1 week, then increase to 300 mg tid and continue as tolerated, Disp: 90 capsule, Rfl: 2    ibuprofen 600 MG Oral Tab, Take 1 tablet (600 mg total) by mouth every 6 (six) hours as needed for Pain., Disp: , Rfl:     omega-3 fatty acids 1000 MG Oral Cap, Take 1,000 mg by mouth daily., Disp: , Rfl:     diphenhydrAMINE HCl, Sleep, (SLEEP  AID) 25 MG Oral Cap, Take by mouth., Disp: , Rfl:     Trolamine Salicylate (ASPERCREME ORIGINAL EX), Apply topically., Disp: , Rfl:     Ergocalciferol (VITAMIN D OR), Take by mouth., Disp: , Rfl:     Cyanocobalamin (VITAMIN B 12 OR), Take by mouth., Disp: , Rfl:     ZINC OR, Use as directed in the mouth or throat., Disp: , Rfl:     Multiple Vitamin (MULTI-VITAMIN) Oral Tab, Take 1 tablet by mouth daily., Disp: , Rfl:     acetaminophen 500 MG Oral Tab, Take 1 tablet (500 mg total) by mouth every 6 (six) hours as needed for Pain., Disp: , Rfl:     Vitamin C 500 MG Oral Tab, Take 1 tablet (500 mg total) by mouth daily., Disp: , Rfl:     folic acid (FOLVITE) 400 MCG Oral Tab, Take 1 tablet (400 mcg total) by mouth daily., Disp: , Rfl:     Review of Systems:  No chest pain or palpitations; otherwise as noted in HPI.    Exam:  BP 98/60   Pulse 86   Resp 18   Ht 59\"   Wt 115 lb (52.2 kg)   LMP 10/26/2023 (Approximate)   SpO2 98%   BMI 23.23 kg/m²   Estimated body mass index is 23.23 kg/m² as calculated from the following:    Height as of this encounter: 59\".    Weight as of this encounter: 115 lb (52.2 kg).    Gen: well developed, well nourished, no acute distress  HEENT: normocephalic  Heart; normal S1/S2, regular rate and rhythm  Lungs: clear to auscultation bilaterally  Extremities: no edema, peripheral pulses intact    Neck: supple, full range of motion; no carotid bruits    Fundoscopic Exam: optic discs sharp bilaterally    Neuro:  Mental status:  Orientation: Alert and oriented to person, place, time  Speech Fluent and conversational    CN: PERRL, EOMI with no nystagmus, VFF, smile symmetric, sensation intact, tongue and palate midline, SCM intact, otherwise, CN 2-12 intact  Motor: 5/5 strength throughout, tone normal  DTR: 1+ symmetric throughout UE, 2+ in patellar absent   R ankle jerk;, toes downgoing bilaterally, no clonus  Sensory: intact to light touch, pin, vibration and proprioception  throughout  Coord: FNF intact with no tremor or dysmetria; rapid alternating movements intact bilaterally  Romberg: absent  Gait: normal casual, heel, toe and tandem gait    Labs:    New    Component      Latest Ref Rng 5/3/2024   Expanded JUVENTINO Antibody Screen, IGG      <0.7 ug/l 0.40    Anti-dsDNA antibody      <10 IU/mL 1.5    Connective Tissue Disease Screen Interpretation      Negative  Negative    C-REACTIVE PROTEIN      <1.00 mg/dL <0.40    RHEUMATOID FACTOR      <14 IU/mL <10    SED RATE      0 - 20 mm/Hr 12    Vitamin B12      211 - 911 pg/mL 683            Imaging:  New    MRI cervical spine (5/10/2024)    FINDINGS: Straightening of the normal cervical lordosis. No cervical spondylolisthesis or fracture. Cervical vertebral body heights are normal. Minimal type I degenerative endplate signal changes at C5-C6 and C6-C7. Cervical vertebral marrow signal is   otherwise normal. The paraspinal musculature is symmetric and normal in morphology and signal. The cervical spinal cord is normal in caliber and signal. No abnormal cervical spinal cord enhancement is identified on postcontrast imaging. No abnormal   intrathecal enhancement is identified in the cervical spine. Subcentimeter perineural cysts at the lateral aspects of the neural foramina on the left at the C7-T1 level and on the right at the T1-T2 level are of questionable clinical significance. No   Chiari malformation.     C1-C2: The atlantoaxial articulation appears normal. No spinal stenosis.     C2-C3: Disc desiccation and normal disc height. No disc herniation or disc bulge. No significant facet arthrosis. No spinal or neural foraminal stenosis.     C3-C4, C4-C5: Disc desiccation and normal disc heights. Mild central disc bulges indent the ventral thecal sac and abut the ventral aspect of the cord at both levels. No significant facet arthrosis. Borderline spinal stenosis (10 mm AP canal dimension).   No significant neural foraminal stenosis.     C5-C6:  Disc desiccation and normal disc height. A small central disc protrusion indents the ventral thecal sac and abuts the ventral aspect of the cord. No significant facet arthrosis. Mild spinal stenosis (9 mm AP canal dimension). No significant neural    foraminal stenosis.     C6-C7: Disc desiccation and normal disc height. No disc herniation or disc bulge. No significant facet arthrosis. No spinal or neural foraminal stenosis.     C7-T1: Disc height and signal are normal. Mild central disc bulge indents the ventral thecal sac. No significant facet arthrosis. Borderline spinal stenosis (10 mm AP canal dimension). No significant neural foraminal stenosis.     IMPRESSION:   1. Mild multilevel cervical spondylosis. Mild central disc bulges at C3-C4 and C4-C5. Small central disc protrusion at C5-C6. Mild central disc bulge at C7-T1.     2. Borderline spinal stenosis at C3-C4 and C4-C5. Mild spinal stenosis at C5-C6. Borderline spinal stenosis at C7-T1. No significant cervical neural foraminal stenosis.     3. No cervical spinal cord abnormalities are identified.     4. Straightening of the normal cervical lordosis which may be positional or related to muscle spasm. No cervical spondylolisthesis or fracture.         Other procedures:    None new    Prior as noted below    NCS/EMG 2/6/2024):   Sensory NCS      Nerve / Sites Rec. Site Onset Lat Peak Lat NP Amp PP Amp Segments Distance Peak Diff Velocity Comment       ms ms µV µV   cm ms m/s     R Median - Dig II (Antidromic)      Wrist Index 4.01 4.90 47.0 90.7 Wrist - Index 14   35        Ref.   ?3.30 ?4.00 ?17.0 ?19.0 Ref.           L Median - Dig II (Antidromic)      Wrist Index 2.81 3.70 62.4 103.8 Wrist - Index 13   46        Ref.   ?3.30 ?4.00 ?17.0 ?19.0 Ref.           R Ulnar - Dig V (Antidromic)      Wrist Dig V 2.08 2.92 84.7 128.5 Wrist - Dig V 11   53        Ref.   ?3.10 ?4.00 ?14.0 ?13.0 Ref.           L Ulnar - Dig V (Antidromic)      Wrist Dig V 1.93 2.60 65.5  88.2 Wrist - Dig V 11   57        Ref.   ?3.10 ?4.00 ?14.0 ?13.0 Ref.           R Radial - Superficial (Antidromic)      Forearm Wrist 1.88 2.50 51.8 72.8 Forearm - Wrist 10   53        Ref.   ?2.20 ?2.80 ?7.0 ?11.0 Ref.              2 Wrist 1.93 2.50 51.5 76.5             L Radial - Superficial (Antidromic)      Forearm Wrist 1.67 2.19 64.0 64.4 Forearm - Wrist 10   60        Ref.   ?2.20 ?2.80 ?7.0 ?11.0 Ref.           R Median, Ulnar - Transcarpal comparison      Median Palm Wrist 2.40 3.23 26.1 23.1 Median Palm - Wrist 8   33        Ulnar Palm Wrist 1.30 1.82 33.3 36.3 Ulnar Palm - Wrist 8   61                 Median Palm - Ulnar Palm   1.41                   Ref.   ?0.40       L Median, Ulnar - Transcarpal comparison      Median Palm Wrist 1.93 2.55 26.3 25.8 Median Palm - Wrist 8   42        Ulnar Palm Wrist 1.25 1.77 28.7 36.5 Ulnar Palm - Wrist 8   64                 Median Palm - Ulnar Palm   0.78                   Ref.   ?0.40           Motor NCS      Nerve / Sites Muscle Latency Amplitude Segments Dist. Lat Diff Velocity Comments       ms mV   cm ms m/s     R Median - APB      Wrist APB 4.69 10.5 Wrist - APB 7            Ref.   ?4.40 ?4.2 Ref.              Elbow APB 8.29 10.4 Elbow - Wrist 19.5 3.60 54.1        Ref.       Ref.     ?51.0     L Median - APB      Wrist APB 3.75 10.8 Wrist - APB 7            Ref.   ?4.40 ?4.2 Ref.              Elbow APB 7.17 10.2 Elbow - Wrist 20 3.42 58.5        Ref.       Ref.     ?51.0     R Ulnar - ADM      Wrist ADM 2.73 9.6 Wrist - ADM 7            Ref.   ?3.70 ?7.9 Ref.              B.Elbow ADM 5.42 9.8 B.Elbow - Wrist 17 2.69 63.3        Ref.       Ref.     ?52.0        A.Elbow ADM 7.23 8.9 A.Elbow - B.Elbow 10 1.81 55.2        Ref.       Ref.     ?43.0     L Ulnar - ADM      Wrist ADM 2.50 10.2 Wrist - ADM 7            Ref.   ?3.70 ?7.9 Ref.              B.Elbow ADM 5.08 10.2 B.Elbow - Wrist 16 2.58 61.9        Ref.       Ref.     ?52.0        A.Elbow ADM 6.83 9.1  A.Elbow - B.Elbow 10 1.75 57.1        Ref.       Ref.     ?43.0         F  Wave      Nerve M Latency F Latency     ms ms   R Median - APB 5.3 26.0   Ref.   ?25.7   R Ulnar - ADM 3.1 25.0   Ref.   ?26.1   L Median - APB 4.0 24.8   Ref.   ?25.7   L Ulnar - ADM 2.2 23.3   Ref.   ?26.1                   EMG Summary Table       Spontaneous MUAP Recruitment   Muscle IA Fib PSW Fasc H.F. Amp Dur. PPP Pattern   L. Deltoid N None None None None N N N N   L. Biceps brachii N None None None None N N N N   L. Triceps brachii N None None None None N N N N   L. Extensor digitorum communis N None None None None N N N N   L. First dorsal interosseous N None None None None N N N N   L. Abductor pollicis brevis N None None None None N N N N   R. Deltoid N None None None None N N N N   R. Biceps brachii N None None None None N N N N   R. Triceps brachii N None None None None N N N N   R. Extensor digitorum communis N None None None None N N N N   R. First dorsal interosseous N None None None None N N N N   R. Abductor pollicis brevis N None None None None N N N N       Summary     The motor conduction test was performed on 4 nerve(s). The results were normal in 3 nerve(s): L Median - APB, R Ulnar - ADM, L Ulnar - ADM. Results outside the specified normal range were found in 1 nerve(s), as follows:  In the R Median - APB study  the take off latency result was increased for Wrist stimulation     The sensory conduction test was performed on 8 nerve(s). The results were normal in 5 nerve(s): L Median - Dig II (Antidromic), R Ulnar - Dig V (Antidromic), L Ulnar - Dig V (Antidromic), R Radial - Superficial (Antidromic), L Radial - Superficial (Antidromic). Findings were unremarkable in 2 nerve(s): R Median, Ulnar - Transcarpal comparison, L Median, Ulnar - Transcarpal comparison. Results outside the specified normal range were found in 1 nerve(s), as follows:  In the R Median - Dig II (Antidromic) study  the peak latency result was  increased for Wrist stimulation     The F wave study was unremarkable in all 4 of the tested nerves: R Median - APB, R Ulnar - ADM, L Median - APB, L Ulnar - ADM     The needle EMG study was normal in all 12 tested muscles: L. Deltoid, L. Biceps brachii, L. Triceps brachii, L. Extensor digitorum communis, L. First dorsal interosseous, L. Abductor pollicis brevis, R. Deltoid, R. Biceps brachii, R. Triceps brachii, R. Extensor digitorum communis, R. First dorsal interosseous, R. Abductor pollicis brevis.              Conclusion:   This is an abnormal study. 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   This involves sensory fibers with demyelinating features with no motor involvement or secondary denervation.  This is more severe on the right than the left side. In the correct clinical context, this would be consistent with a mild to moderate carpal tunnel syndrome on the right side and a mild carpal tunnel syndrome on the left side.            Impression/ Plan:  Chris Weaver is a 49 year old female, who originally presented 1/2/2024 for evaluation of right arm numbness. Patient states a few months ago, she noted numbness in R arm mainly from elbow down to all fingers, with no neck pain or radiating pain from neck to hands but some weakness when trying to open small things (like jars).  This has been actually been occurring for the past year but intermittently improves on own; more recently, she notes some tingling in whole left arm and left hand; right side is worse in digits 1-2.    NCS/EMG done showed no large fiber polyneuropathy only showing bilateral median entrapment neuropathy, Right worse than Left but her symptoms seem more consistent with cervical radiculopathy.     MRI cervical spine was done and showed no myelopathy / intramedullary pathology but did show some mild spinal stenosis and degenerative disc disease - she has not had  much improvement on gabapentin and recommend try cyclobenzaprine PRN at night to start  and refer to PT for evaluation; B12, ESR, CRP, RF, and EM negative.     1. Cervical radicular pain  As noted above  - Physical Therapy Referral - [facility]    2. Right arm numbness  As noted above  - Physical Therapy Referral - [facility]    3. Bilateral carpal tunnel syndrome  As noted above     4. Muscle spasm  As noted above   - cyclobenzaprine 10 MG Oral Tab; Take 1 tablet (10 mg total) by mouth 3 (three) times daily as needed for Muscle spasms.  Dispense: 30 tablet; Refill: 0    Return in about 3 months (around 10/17/2024).    Suraj Anthony MD, Neurology  [facility]  Pager [phone]  7/17/2024

## 2024-10-16 ENCOUNTER — OFFICE VISIT (OUTPATIENT)
Dept: INTERNAL MEDICINE CLINIC | Facility: CLINIC | Age: 49
End: 2024-10-16
Payer: COMMERCIAL

## 2024-10-16 VITALS
WEIGHT: 121 LBS | BODY MASS INDEX: 24.39 KG/M2 | HEIGHT: 59 IN | SYSTOLIC BLOOD PRESSURE: 99 MMHG | DIASTOLIC BLOOD PRESSURE: 66 MMHG | HEART RATE: 73 BPM

## 2024-10-16 DIAGNOSIS — Z11.1 SCREENING-PULMONARY TB: ICD-10-CM

## 2024-10-16 DIAGNOSIS — Z00.00 PHYSICAL EXAM: Primary | ICD-10-CM

## 2024-10-16 DIAGNOSIS — Z12.4 SCREENING FOR CERVICAL CANCER: ICD-10-CM

## 2024-10-16 DIAGNOSIS — Z12.11 SCREENING FOR COLON CANCER: ICD-10-CM

## 2024-10-16 DIAGNOSIS — Z12.31 VISIT FOR SCREENING MAMMOGRAM: ICD-10-CM

## 2024-10-16 LAB
BILIRUBIN: NEGATIVE
GLUCOSE (URINE DIPSTICK): NEGATIVE MG/DL
KETONES (URINE DIPSTICK): NEGATIVE MG/DL
MULTISTIX LOT#: ABNORMAL NUMERIC
NITRITE, URINE: NEGATIVE
OCCULT BLOOD: NEGATIVE
PH, URINE: 8.5 (ref 4.5–8)
PROTEIN (URINE DIPSTICK): NEGATIVE MG/DL
SPECIFIC GRAVITY: 1.02 (ref 1–1.03)
URINE-COLOR: YELLOW
UROBILINOGEN,SEMI-QN: 0.2 MG/DL (ref 0–1.9)

## 2024-10-17 ENCOUNTER — LAB ENCOUNTER (OUTPATIENT)
Dept: LAB | Facility: HOSPITAL | Age: 49
End: 2024-10-17
Attending: INTERNAL MEDICINE
Payer: COMMERCIAL

## 2024-10-17 DIAGNOSIS — Z00.00 PHYSICAL EXAM: ICD-10-CM

## 2024-10-17 DIAGNOSIS — Z11.1 SCREENING-PULMONARY TB: ICD-10-CM

## 2024-10-17 LAB
ALBUMIN SERPL-MCNC: 4.6 G/DL (ref 3.2–4.8)
ALBUMIN/GLOB SERPL: 1.8 {RATIO} (ref 1–2)
ALP LIVER SERPL-CCNC: 71 U/L
ALT SERPL-CCNC: 30 U/L
ANION GAP SERPL CALC-SCNC: 6 MMOL/L (ref 0–18)
AST SERPL-CCNC: 27 U/L (ref ?–34)
ATRIAL RATE: 63 BPM
BASOPHILS # BLD AUTO: 0.04 X10(3) UL (ref 0–0.2)
BASOPHILS NFR BLD AUTO: 0.8 %
BILIRUB SERPL-MCNC: 0.5 MG/DL (ref 0.3–1.2)
BILIRUB UR QL: NEGATIVE
BUN BLD-MCNC: 11 MG/DL (ref 9–23)
BUN/CREAT SERPL: 22 (ref 10–20)
CALCIUM BLD-MCNC: 9.2 MG/DL (ref 8.7–10.4)
CHLORIDE SERPL-SCNC: 111 MMOL/L (ref 98–112)
CHOLEST SERPL-MCNC: 194 MG/DL (ref ?–200)
CLARITY UR: CLEAR
CO2 SERPL-SCNC: 26 MMOL/L (ref 21–32)
CREAT BLD-MCNC: 0.5 MG/DL
DEPRECATED RDW RBC AUTO: 43.9 FL (ref 35.1–46.3)
EGFRCR SERPLBLD CKD-EPI 2021: 116 ML/MIN/1.73M2 (ref 60–?)
EOSINOPHIL # BLD AUTO: 0.13 X10(3) UL (ref 0–0.7)
EOSINOPHIL NFR BLD AUTO: 2.6 %
ERYTHROCYTE [DISTWIDTH] IN BLOOD BY AUTOMATED COUNT: 12.7 % (ref 11–15)
EST. AVERAGE GLUCOSE BLD GHB EST-MCNC: 108 MG/DL (ref 68–126)
FASTING PATIENT LIPID ANSWER: YES
FASTING STATUS PATIENT QL REPORTED: YES
GLOBULIN PLAS-MCNC: 2.5 G/DL (ref 2–3.5)
GLUCOSE BLD-MCNC: 105 MG/DL (ref 70–99)
GLUCOSE UR-MCNC: NORMAL MG/DL
HBA1C MFR BLD: 5.4 % (ref ?–5.7)
HCT VFR BLD AUTO: 40.7 %
HDLC SERPL-MCNC: 81 MG/DL (ref 40–59)
HGB BLD-MCNC: 13.7 G/DL
HGB UR QL STRIP.AUTO: NEGATIVE
IMM GRANULOCYTES # BLD AUTO: 0.01 X10(3) UL (ref 0–1)
IMM GRANULOCYTES NFR BLD: 0.2 %
KETONES UR-MCNC: NEGATIVE MG/DL
LDLC SERPL CALC-MCNC: 106 MG/DL (ref ?–100)
LEUKOCYTE ESTERASE UR QL STRIP.AUTO: NEGATIVE
LYMPHOCYTES # BLD AUTO: 1.54 X10(3) UL (ref 1–4)
LYMPHOCYTES NFR BLD AUTO: 30.3 %
MCH RBC QN AUTO: 31.5 PG (ref 26–34)
MCHC RBC AUTO-ENTMCNC: 33.7 G/DL (ref 31–37)
MCV RBC AUTO: 93.6 FL
MONOCYTES # BLD AUTO: 0.42 X10(3) UL (ref 0.1–1)
MONOCYTES NFR BLD AUTO: 8.3 %
NEUTROPHILS # BLD AUTO: 2.94 X10 (3) UL (ref 1.5–7.7)
NEUTROPHILS # BLD AUTO: 2.94 X10(3) UL (ref 1.5–7.7)
NEUTROPHILS NFR BLD AUTO: 57.8 %
NITRITE UR QL STRIP.AUTO: NEGATIVE
NONHDLC SERPL-MCNC: 113 MG/DL (ref ?–130)
OSMOLALITY SERPL CALC.SUM OF ELEC: 296 MOSM/KG (ref 275–295)
P AXIS: 16 DEGREES
P-R INTERVAL: 160 MS
PH UR: 5.5 [PH] (ref 5–8)
PLATELET # BLD AUTO: 203 10(3)UL (ref 150–450)
POTASSIUM SERPL-SCNC: 4.1 MMOL/L (ref 3.5–5.1)
PROT SERPL-MCNC: 7.1 G/DL (ref 5.7–8.2)
PROT UR-MCNC: NEGATIVE MG/DL
Q-T INTERVAL: 418 MS
QRS DURATION: 78 MS
QTC CALCULATION (BEZET): 427 MS
R AXIS: 19 DEGREES
RBC # BLD AUTO: 4.35 X10(6)UL
SODIUM SERPL-SCNC: 143 MMOL/L (ref 136–145)
SP GR UR STRIP: 1.02 (ref 1–1.03)
T AXIS: 18 DEGREES
T4 FREE SERPL-MCNC: 1.2 NG/DL (ref 0.8–1.7)
TRIGL SERPL-MCNC: 34 MG/DL (ref 30–149)
TSI SER-ACNC: 1.35 MIU/ML (ref 0.55–4.78)
UROBILINOGEN UR STRIP-ACNC: NORMAL
VENTRICULAR RATE: 63 BPM
VLDLC SERPL CALC-MCNC: 6 MG/DL (ref 0–30)
WBC # BLD AUTO: 5.1 X10(3) UL (ref 4–11)

## 2024-10-17 PROCEDURE — 93005 ELECTROCARDIOGRAM TRACING: CPT

## 2024-10-17 PROCEDURE — 36415 COLL VENOUS BLD VENIPUNCTURE: CPT

## 2024-10-17 PROCEDURE — 83036 HEMOGLOBIN GLYCOSYLATED A1C: CPT

## 2024-10-17 PROCEDURE — 84443 ASSAY THYROID STIM HORMONE: CPT

## 2024-10-17 PROCEDURE — 86480 TB TEST CELL IMMUN MEASURE: CPT

## 2024-10-17 PROCEDURE — 93010 ELECTROCARDIOGRAM REPORT: CPT | Performed by: INTERNAL MEDICINE

## 2024-10-17 PROCEDURE — 80061 LIPID PANEL: CPT

## 2024-10-17 PROCEDURE — 81003 URINALYSIS AUTO W/O SCOPE: CPT

## 2024-10-17 PROCEDURE — 84439 ASSAY OF FREE THYROXINE: CPT

## 2024-10-17 PROCEDURE — 85025 COMPLETE CBC W/AUTO DIFF WBC: CPT

## 2024-10-17 PROCEDURE — 80053 COMPREHEN METABOLIC PANEL: CPT

## 2024-10-18 LAB
M TB IFN-G CD4+ T-CELLS BLD-ACNC: 0.35 IU/ML
M TB TUBERC IFN-G BLD QL: NEGATIVE
M TB TUBERC IGNF/MITOGEN IGNF CONTROL: >10 IU/ML
QFT TB1 AG MINUS NIL: 0.03 IU/ML
QFT TB2 AG MINUS NIL: 0.1 IU/ML

## 2024-10-28 NOTE — PROGRESS NOTES
HPI:    Patient ID: Chris Weaver is a 49 year old female.    UTI  Pertinent negatives include no abdominal pain, chest pain, chills, coughing, fatigue, fever, headaches, joint swelling, nausea, rash, sore throat, vomiting or weakness.       Physical exam  Generally healthy    BP 99/66 (BP Location: Left arm, Patient Position: Sitting, Cuff Size: adult)   Pulse 73   Ht 4' 11\" (1.499 m)   Wt 121 lb (54.9 kg)   LMP 10/04/2024 (Approximate)   BMI 24.44 kg/m²   Wt Readings from Last 6 Encounters:   10/16/24 121 lb (54.9 kg)   07/17/24 115 lb (52.2 kg)   04/12/24 115 lb (52.2 kg)   01/02/24 115 lb (52.2 kg)   09/26/23 115 lb (52.2 kg)   03/28/23 118 lb (53.5 kg)     Body mass index is 24.44 kg/m².  HGBA1C:    Lab Results   Component Value Date    A1C 5.4 10/17/2024    A1C 5.3 10/19/2023    A1C 5.4 10/14/2022     10/17/2024         Review of Systems   Constitutional:  Negative for activity change, chills, fatigue and fever.   HENT:  Negative for ear discharge, nosebleeds, postnasal drip, rhinorrhea, sinus pressure and sore throat.    Eyes:  Negative for pain, discharge and redness.   Respiratory:  Negative for cough, chest tightness, shortness of breath and wheezing.    Cardiovascular:  Negative for chest pain, palpitations and leg swelling.   Gastrointestinal:  Negative for abdominal pain, blood in stool, constipation, diarrhea, nausea and vomiting.   Genitourinary:  Negative for difficulty urinating, dysuria (mild), frequency, hematuria and urgency.   Musculoskeletal:  Negative for back pain, gait problem and joint swelling.   Skin:  Negative for rash.   Neurological:  Negative for syncope, weakness, light-headedness and headaches.   Psychiatric/Behavioral:  Negative for dysphoric mood. The patient is not nervous/anxious.          Current Outpatient Medications   Medication Sig Dispense Refill    cyclobenzaprine 10 MG Oral Tab Take 1 tablet (10 mg total) by mouth 3 (three) times daily as needed for  Muscle spasms. 30 tablet 0    gabapentin 300 MG Oral Cap Take 1 capsule (300 mg total) by mouth 3 (three) times daily. Start at 300 mg bid for 1 week, then increase to 300 mg tid and continue as tolerated 90 capsule 2    ibuprofen 600 MG Oral Tab Take 1 tablet (600 mg total) by mouth every 6 (six) hours as needed for Pain.      omega-3 fatty acids 1000 MG Oral Cap Take 1,000 mg by mouth daily.      diphenhydrAMINE HCl, Sleep, (SLEEP AID) 25 MG Oral Cap Take by mouth.      Trolamine Salicylate (ASPERCREME ORIGINAL EX) Apply topically.      acetaminophen 500 MG Oral Tab Take 1 tablet (500 mg total) by mouth every 6 (six) hours as needed for Pain.      Vitamin C 500 MG Oral Tab Take 1 tablet (500 mg total) by mouth daily.      folic acid (FOLVITE) 400 MCG Oral Tab Take 1 tablet (400 mcg total) by mouth daily.      Ergocalciferol (VITAMIN D OR) Take by mouth. (Patient not taking: Reported on 10/16/2024)      ZINC OR Use as directed in the mouth or throat. (Patient not taking: Reported on 10/16/2024)       Allergies:Allergies[1]    HISTORY:  Past Medical History:    Bilateral dry eyes    Meibomian gland dysfunction (MGD), bilateral, both upper and lower lids    Meibomian gland dysfunction (MGD), bilateral, both upper and lower lids      Past Surgical History:   Procedure Laterality Date    Skin surgery  2005    sebaceous cyst removal between breasts      Family History   Problem Relation Age of Onset    Hypertension Father     Hypertension Brother     Glaucoma Brother     Diabetes Neg     Macular degeneration Neg       Social History:   Social History     Socioeconomic History    Marital status:    Tobacco Use    Smoking status: Never     Passive exposure: Never    Smokeless tobacco: Never   Substance and Sexual Activity    Alcohol use: Yes     Comment: Social    Drug use: No   Other Topics Concern    Caffeine Concern Yes     Comment: 1/2 cup daily    Exercise Yes     Comment: walking        PHYSICAL EXAM:     Physical Exam  Constitutional:       General: She is not in acute distress.     Appearance: She is well-developed. She is not diaphoretic.   HENT:      Head: Normocephalic and atraumatic.      Right Ear: Ear canal normal.      Left Ear: Ear canal normal.      Nose: Nose normal.      Mouth/Throat:      Pharynx: No oropharyngeal exudate or posterior oropharyngeal erythema.   Eyes:      General: No scleral icterus.        Right eye: No discharge.         Left eye: No discharge.      Conjunctiva/sclera: Conjunctivae normal.      Pupils: Pupils are equal, round, and reactive to light.   Cardiovascular:      Rate and Rhythm: Normal rate and regular rhythm.      Heart sounds: Normal heart sounds. No murmur heard.  Pulmonary:      Effort: Pulmonary effort is normal. No respiratory distress.      Breath sounds: Normal breath sounds. No wheezing.   Abdominal:      General: Bowel sounds are normal.      Palpations: Abdomen is soft. There is no mass.      Tenderness: There is no abdominal tenderness. There is no guarding or rebound.      Hernia: No hernia is present.   Musculoskeletal:         General: No tenderness.   Skin:     General: Skin is warm and dry.      Findings: No lesion or rash.   Neurological:      Mental Status: She is alert.      Gait: Gait normal.   Psychiatric:         Behavior: Behavior normal.         Thought Content: Thought content normal.              ASSESSMENT/PLAN:   (Z00.00) Physical exam  (primary encounter diagnosis)  Plan: CBC With Differential With Platelet, Comp         Metabolic Panel (14), Lipid Panel, Hemoglobin         A1C, TSH and Free T4, Urinalysis, Routine, EKG         12 Lead  Generally healthy  Health screening  gyne  pap smear mammogram and colonosocpy advised    (Z11.1) Screening-pulmonary TB  Plan: Quantiferon TB Gold (in Tube)        Asymptomatic  monitor      (Z12.31) Visit for screening mammogram  Plan: [facility] FRANTZ 2D+3D SCREENING BILAT         (CPT=77067/24942)        .Breast exam.   Bilateral  No discrete palpable masses or tenderness    No nipple discharge  And no axillary adenopathy.  Self breast exam advised      (Z12.4) Screening for cervical cancer  Plan: [facility] - INTERNAL        Referral given     (Z12.11) Screening for colon cancer  Plan: [facility]STR[facility] - INTERNAL        Asymptomatic  monitor      Patient voiced understanding  and agrees with plan         Orders Placed This Encounter   Procedures    POC Urinalysis, Manual Dip without microscopy [76038]    CBC With Differential With Platelet    Comp Metabolic Panel (14)    Lipid Panel    Hemoglobin A1C    TSH and Free T4    Urinalysis, Routine    Quantiferon TB Gold (in Tube)    Fluzone trivalent vaccine, PF 0.5mL, 6mo+ ([id number])       Meds This Visit:  Requested Prescriptions      No prescriptions requested or ordered in this encounter       Imaging & Referrals:  INFLUENZA VACCINE, TRI, PRESERV FREE, 0.5 ML  OBG - INTERNAL  GASTRO - INTERNAL  [facility] FRANTZ 2D+3D SCREENING BILAT (CPT=77067/26940)        ID#[id number]           [1] No Known Allergies

## 2024-11-06 ENCOUNTER — OFFICE VISIT (OUTPATIENT)
Dept: NEUROLOGY | Facility: CLINIC | Age: 49
End: 2024-11-06
Payer: COMMERCIAL

## 2024-11-06 VITALS
BODY MASS INDEX: 24.39 KG/M2 | RESPIRATION RATE: 16 BRPM | SYSTOLIC BLOOD PRESSURE: 106 MMHG | HEART RATE: 71 BPM | DIASTOLIC BLOOD PRESSURE: 61 MMHG | WEIGHT: 121 LBS | HEIGHT: 59 IN

## 2024-11-06 DIAGNOSIS — M54.12 CERVICAL RADICULAR PAIN: ICD-10-CM

## 2024-11-06 DIAGNOSIS — G56.03 BILATERAL CARPAL TUNNEL SYNDROME: Primary | ICD-10-CM

## 2024-11-06 DIAGNOSIS — R20.0 RIGHT ARM NUMBNESS: ICD-10-CM

## 2024-11-06 PROCEDURE — 99213 OFFICE O/P EST LOW 20 MIN: CPT | Performed by: OTHER

## 2024-11-06 RX ORDER — GABAPENTIN 300 MG/1
300 CAPSULE ORAL 2 TIMES DAILY
Qty: 60 CAPSULE | Refills: 2 | Status: SHIPPED | OUTPATIENT
Start: 2024-11-06

## 2024-11-06 RX ORDER — GABAPENTIN 300 MG/1
300 CAPSULE ORAL NIGHTLY
COMMUNITY
End: 2024-11-06

## 2024-11-06 NOTE — PATIENT INSTRUCTIONS
Refill policies:    Allow 2-3 business days for refills; controlled substances may take longer.  Contact your pharmacy at least 5 days prior to running out of medication and have them send an electronic request or submit request through the “request refill” option in your [employer] account.  Refills are not addressed on weekends; covering physicians do not authorize routine medications on weekends.  No narcotics or controlled substances are refilled after noon on Fridays or by on call physicians.  By law, narcotics must be electronically prescribed.  A 30 day supply with no refills is the maximum allowed.  If your prescription is due for a refill, you may be due for a follow up appointment.  To best provide you care, patients receiving routine medications need to be seen at least once a year.  Patients receiving narcotic/controlled substance medications need to be seen at least once every 3 months.  In the event that your preferred pharmacy does not have the requested medication in stock (e.g. Backordered), it is your responsibility to find another pharmacy that has the requested medication available.  We will gladly send a new prescription to that pharmacy at your request.    Scheduling Tests:    If your physician has ordered radiology tests such as MRI or CT scans, please contact Central Scheduling at [phone] right away to schedule the test.  Once scheduled, the [facility] Centralized Referral Team will work with your insurance carrier to obtain pre-certification or prior authorization.  Depending on your insurance carrier, approval may take 3-10 days.  It is highly recommended patients assure they have received an authorization before having a test performed.  If test is done without insurance authorization, patient may be responsible for the entire amount billed.      Precertification and Prior Authorizations:  If your physician has recommended that you have a procedure or additional testing performed the [facility]  Centralized Referral Team will contact your insurance carrier to obtain pre-certification or prior authorization.    You are strongly encouraged to contact your insurance carrier to verify that your procedure/test has been approved and is a COVERED benefit.  Although the [facility] Centralized Referral Team does its due diligence, the insurance carrier gives the disclaimer that \"Although the procedure is authorized, this does not guarantee payment.\"    Ultimately the patient is responsible for payment.   Thank you for your understanding in this matter.  Paperwork Completion:  If you require FMLA or disability paperwork for your recovery, please make sure to either drop it off or have it faxed to our office at [phone]. Be sure the form has your name and date of birth on it.  The form will be faxed to our Forms Department and they will complete it for you.  There is a 25$ fee for all forms that need to be filled out.  Please be aware there is a 10-14 day turnaround time.  You will need to sign a release of information (CHAPIN) form if your paperwork does not come with one.  You may call the Forms Department with any questions at [phone].  Their fax number is [phone].

## 2024-11-06 NOTE — PROGRESS NOTES
[facility] Progress Note    HPI  Chief Complaint   Patient presents with    Neck Pain     3 month follow up and still having pain and not getting worse       As per my initial H&P from 1/2/2024,   \" Chris Weaver is a 48 year old, who presents for evaluation of right arm numbness.  Patient states a few months ago, she noted numbness in R arm mainly from elbow down to all fingers, with no neck pain or radiating pain from neck to hands but some weakness when trying to open small things (like jars).  This has been actually been occurring for the past year but intermittently improves on own; more recently, she notes some tingling in whole left arm and left hand; right side is worse in digits 1-2.  She denies numbness in feet of tingling in feet; notes chronic feeling of \"cold sensation\" in hands; denies dropping objects from left hand or tripping over feet or balance issues or falls.     She denies shooting pains from neck to hands when flexing forward.      She notes some improvement in right hand when flexing elbow R side when walking but worse when dropping arm at side.    She denies recent or past injury to neck.     She denies double vision, loss of vision or bowel / bladder changes and has chronic headaches / migraines; she was   Otherwise, patient denies any recent weight change, fevers, chills, nausea, double vision/ blurry vision / loss of vision, chest pain, palpitations, shortness of breath, rashes, joint pains, bowel / bladder incontinence or mood issues. \"      Prior notes as per 4/12/2024.  Patient since last visit temporarily noted improvement in tingling in hands when she started taking gabapentin 300 mg nightly; however, in the past month, she has been having numbness in right arm from shoulder down to all fingers not clearly associated with position but having more pain at night time in same area; she denies neck pain and denies dropping objects from hands; no balance issues  or falls or vision changes or vertigo; no new other numbness/tingling/balance issues.        Prior notes as per 7/17/2024.  Patient last seen 4/12/2024. She notes tingling and numbness on right side when lying on her side or when putting arms down - she denies weakness or dropping objects from hands. She has been taking gabapentin but only taking at night rather than 3 times daily. She denies balance issues, falls or new focal numbness/tingling/weakness.       Patient last seen 7/17/2024.  She still has tingling and numbness when lying on her side. She remains on gabapentin 300 mg nightly. She has been wearing wrist splints at night and notes numbness in R hand from elbow down to wrist; she did not see PT since last visit but states pain is not waking her up at night anymore; she has not been dropping objects from hands and denies numbness in feet.     Past Medical History:    Bilateral dry eyes    Meibomian gland dysfunction (MGD), bilateral, both upper and lower lids    Meibomian gland dysfunction (MGD), bilateral, both upper and lower lids     Past Surgical History:   Procedure Laterality Date    Skin surgery  2005    sebaceous cyst removal between breasts     Family History   Problem Relation Age of Onset    Hypertension Father     Hypertension Brother     Glaucoma Brother     Diabetes Neg     Macular degeneration Neg      Social History     Socioeconomic History    Marital status:    Tobacco Use    Smoking status: Never     Passive exposure: Never    Smokeless tobacco: Never   Vaping Use    Vaping status: Never Used   Substance and Sexual Activity    Alcohol use: Yes     Comment: Social    Drug use: No   Other Topics Concern    Caffeine Concern Yes     Comment: 1/2 cup daily    Exercise Yes     Comment: walking       No Known Allergies      Current Outpatient Medications:     gabapentin 300 MG Oral Cap, Take 1 capsule (300 mg total) by mouth in the morning and 1 capsule (300 mg total) before bedtime.,  Disp: 60 capsule, Rfl: 2    ibuprofen 600 MG Oral Tab, Take 1 tablet (600 mg total) by mouth every 6 (six) hours as needed for Pain., Disp: , Rfl:     omega-3 fatty acids 1000 MG Oral Cap, Take 1,000 mg by mouth daily., Disp: , Rfl:     diphenhydrAMINE HCl, Sleep, (SLEEP AID) 25 MG Oral Cap, Take by mouth., Disp: , Rfl:     Trolamine Salicylate (ASPERCREME ORIGINAL EX), Apply topically., Disp: , Rfl:     acetaminophen 500 MG Oral Tab, Take 1 tablet (500 mg total) by mouth every 6 (six) hours as needed for Pain., Disp: , Rfl:     Vitamin C 500 MG Oral Tab, Take 1 tablet (500 mg total) by mouth daily., Disp: , Rfl:     folic acid (FOLVITE) 400 MCG Oral Tab, Take 1 tablet (400 mcg total) by mouth daily., Disp: , Rfl:     Review of Systems:  No chest pain or palpitations; otherwise as noted in HPI.    Exam:  /61 (BP Location: Left arm, Patient Position: Sitting, Cuff Size: adult)   Pulse 71   Resp 16   Ht 59\"   Wt 121 lb (54.9 kg)   LMP 10/04/2024 (Approximate)   BMI 24.44 kg/m²   Estimated body mass index is 24.44 kg/m² as calculated from the following:    Height as of this encounter: 59\".    Weight as of this encounter: 121 lb (54.9 kg).    Gen: well developed, well nourished, no acute distress  HEENT: normocephalic  Heart; normal S1/S2, regular rate and rhythm  Lungs: clear to auscultation bilaterally  Extremities: no edema, peripheral pulses intact    Neck: supple, full range of motion; no carotid bruits    Fundoscopic Exam: optic discs sharp bilaterally    Neuro:  Mental status:  Orientation: Alert and oriented to person, place, time  Speech Fluent and conversational    CN: PERRL, EOMI with no nystagmus, VFF, smile symmetric, sensation intact, tongue and palate midline, SCM intact, otherwise, CN 2-12 intact  Motor: 5/5 strength throughout, tone normal  DTR: 1+ symmetric throughout UE, 2+ in patellar absent   R ankle jerk;, toes downgoing bilaterally, no clonus  Sensory: intact to light touch, pin,  vibration and proprioception throughout  Coord: FNF intact with no tremor or dysmetria; rapid alternating movements intact bilaterally  Romberg: absent  Gait: normal casual, heel, toe and tandem gait    Labs:    None new    Prior as noted below    Component      Latest Ref Rng 5/3/2024   Expanded JUVENTINO Antibody Screen, IGG      <0.7 ug/l 0.40    Anti-dsDNA antibody      <10 IU/mL 1.5    Connective Tissue Disease Screen Interpretation      Negative  Negative    C-REACTIVE PROTEIN      <1.00 mg/dL <0.40    RHEUMATOID FACTOR      <14 IU/mL <10    SED RATE      0 - 20 mm/Hr 12    Vitamin B12      211 - 911 pg/mL 683            Imaging:  None new    Prior as noted below    MRI cervical spine (5/10/2024)    FINDINGS: Straightening of the normal cervical lordosis. No cervical spondylolisthesis or fracture. Cervical vertebral body heights are normal. Minimal type I degenerative endplate signal changes at C5-C6 and C6-C7. Cervical vertebral marrow signal is   otherwise normal. The paraspinal musculature is symmetric and normal in morphology and signal. The cervical spinal cord is normal in caliber and signal. No abnormal cervical spinal cord enhancement is identified on postcontrast imaging. No abnormal   intrathecal enhancement is identified in the cervical spine. Subcentimeter perineural cysts at the lateral aspects of the neural foramina on the left at the C7-T1 level and on the right at the T1-T2 level are of questionable clinical significance. No   Chiari malformation.     C1-C2: The atlantoaxial articulation appears normal. No spinal stenosis.     C2-C3: Disc desiccation and normal disc height. No disc herniation or disc bulge. No significant facet arthrosis. No spinal or neural foraminal stenosis.     C3-C4, C4-C5: Disc desiccation and normal disc heights. Mild central disc bulges indent the ventral thecal sac and abut the ventral aspect of the cord at both levels. No significant facet arthrosis. Borderline spinal  stenosis (10 mm AP canal dimension).   No significant neural foraminal stenosis.     C5-C6: Disc desiccation and normal disc height. A small central disc protrusion indents the ventral thecal sac and abuts the ventral aspect of the cord. No significant facet arthrosis. Mild spinal stenosis (9 mm AP canal dimension). No significant neural    foraminal stenosis.     C6-C7: Disc desiccation and normal disc height. No disc herniation or disc bulge. No significant facet arthrosis. No spinal or neural foraminal stenosis.     C7-T1: Disc height and signal are normal. Mild central disc bulge indents the ventral thecal sac. No significant facet arthrosis. Borderline spinal stenosis (10 mm AP canal dimension). No significant neural foraminal stenosis.     IMPRESSION:   1. Mild multilevel cervical spondylosis. Mild central disc bulges at C3-C4 and C4-C5. Small central disc protrusion at C5-C6. Mild central disc bulge at C7-T1.     2. Borderline spinal stenosis at C3-C4 and C4-C5. Mild spinal stenosis at C5-C6. Borderline spinal stenosis at C7-T1. No significant cervical neural foraminal stenosis.     3. No cervical spinal cord abnormalities are identified.     4. Straightening of the normal cervical lordosis which may be positional or related to muscle spasm. No cervical spondylolisthesis or fracture.         Other procedures:    None new    Prior as noted below    NCS/EMG 2/6/2024):   Sensory NCS      Nerve / Sites Rec. Site Onset Lat Peak Lat NP Amp PP Amp Segments Distance Peak Diff Velocity Comment       ms ms µV µV   cm ms m/s     R Median - Dig II (Antidromic)      Wrist Index 4.01 4.90 47.0 90.7 Wrist - Index 14   35        Ref.   <=3.30 <=4.00 >=17.0 >=19.0 Ref.           L Median - Dig II (Antidromic)      Wrist Index 2.81 3.70 62.4 103.8 Wrist - Index 13   46        Ref.   <=3.30 <=4.00 >=17.0 >=19.0 Ref.           R Ulnar - Dig V (Antidromic)      Wrist Dig V 2.08 2.92 84.7 128.5 Wrist - Dig V 11   53        Ref.    <=3.10 <=4.00 >=14.0 >=13.0 Ref.           L Ulnar - Dig V (Antidromic)      Wrist Dig V 1.93 2.60 65.5 88.2 Wrist - Dig V 11   57        Ref.   <=3.10 <=4.00 >=14.0 >=13.0 Ref.           R Radial - Superficial (Antidromic)      Forearm Wrist 1.88 2.50 51.8 72.8 Forearm - Wrist 10   53        Ref.   <=2.20 <=2.80 >=7.0 >=11.0 Ref.              2 Wrist 1.93 2.50 51.5 76.5             L Radial - Superficial (Antidromic)      Forearm Wrist 1.67 2.19 64.0 64.4 Forearm - Wrist 10   60        Ref.   <=2.20 <=2.80 >=7.0 >=11.0 Ref.           R Median, Ulnar - Transcarpal comparison      Median Palm Wrist 2.40 3.23 26.1 23.1 Median Palm - Wrist 8   33        Ulnar Palm Wrist 1.30 1.82 33.3 36.3 Ulnar Palm - Wrist 8   61                 Median Palm - Ulnar Palm   1.41                   Ref.   <=0.40       L Median, Ulnar - Transcarpal comparison      Median Palm Wrist 1.93 2.55 26.3 25.8 Median Palm - Wrist 8   42        Ulnar Palm Wrist 1.25 1.77 28.7 36.5 Ulnar Palm - Wrist 8   64                 Median Palm - Ulnar Palm   0.78                   Ref.   <=0.40           Motor NCS      Nerve / Sites Muscle Latency Amplitude Segments Dist. Lat Diff Velocity Comments       ms mV   cm ms m/s     R Median - APB      Wrist APB 4.69 10.5 Wrist - APB 7            Ref.   <=4.40 >=4.2 Ref.              Elbow APB 8.29 10.4 Elbow - Wrist 19.5 3.60 54.1        Ref.       Ref.     >=51.0     L Median - APB      Wrist APB 3.75 10.8 Wrist - APB 7            Ref.   <=4.40 >=4.2 Ref.              Elbow APB 7.17 10.2 Elbow - Wrist 20 3.42 58.5        Ref.       Ref.     >=51.0     R Ulnar - ADM      Wrist ADM 2.73 9.6 Wrist - ADM 7            Ref.   <=3.70 >=7.9 Ref.              B.Elbow ADM 5.42 9.8 B.Elbow - Wrist 17 2.69 63.3        Ref.       Ref.     >=52.0        A.Elbow ADM 7.23 8.9 A.Elbow - B.Elbow 10 1.81 55.2        Ref.       Ref.     >=43.0     L Ulnar - ADM      Wrist ADM 2.50 10.2 Wrist - ADM 7            Ref.   <=3.70 >=7.9  Ref.              B.Elbow ADM 5.08 10.2 B.Elbow - Wrist 16 2.58 61.9        Ref.       Ref.     >=52.0        A.Elbow ADM 6.83 9.1 A.Elbow - B.Elbow 10 1.75 57.1        Ref.       Ref.     >=43.0         F  Wave      Nerve M Latency F Latency     ms ms   R Median - APB 5.3 26.0   Ref.   <=25.7   R Ulnar - ADM 3.1 25.0   Ref.   <=26.1   L Median - APB 4.0 24.8   Ref.   <=25.7   L Ulnar - ADM 2.2 23.3   Ref.   <=26.1                   EMG Summary Table       Spontaneous MUAP Recruitment   Muscle IA Fib PSW Fasc H.F. Amp Dur. PPP Pattern   L. Deltoid N None None None None N N N N   L. Biceps brachii N None None None None N N N N   L. Triceps brachii N None None None None N N N N   L. Extensor digitorum communis N None None None None N N N N   L. First dorsal interosseous N None None None None N N N N   L. Abductor pollicis brevis N None None None None N N N N   R. Deltoid N None None None None N N N N   R. Biceps brachii N None None None None N N N N   R. Triceps brachii N None None None None N N N N   R. Extensor digitorum communis N None None None None N N N N   R. First dorsal interosseous N None None None None N N N N   R. Abductor pollicis brevis N None None None None N N N N       Summary     The motor conduction test was performed on 4 nerve(s). The results were normal in 3 nerve(s): L Median - APB, R Ulnar - ADM, L Ulnar - ADM. Results outside the specified normal range were found in 1 nerve(s), as follows:  In the R Median - APB study  the take off latency result was increased for Wrist stimulation     The sensory conduction test was performed on 8 nerve(s). The results were normal in 5 nerve(s): L Median - Dig II (Antidromic), R Ulnar - Dig V (Antidromic), L Ulnar - Dig V (Antidromic), R Radial - Superficial (Antidromic), L Radial - Superficial (Antidromic). Findings were unremarkable in 2 nerve(s): R Median, Ulnar - Transcarpal comparison, L Median, Ulnar - Transcarpal comparison. Results outside the  specified normal range were found in 1 nerve(s), as follows:  In the R Median - Dig II (Antidromic) study  the peak latency result was increased for Wrist stimulation     The F wave study was unremarkable in all 4 of the tested nerves: R Median - APB, R Ulnar - ADM, L Median - APB, L Ulnar - ADM     The needle EMG study was normal in all 12 tested muscles: L. Deltoid, L. Biceps brachii, L. Triceps brachii, L. Extensor digitorum communis, L. First dorsal interosseous, L. Abductor pollicis brevis, R. Deltoid, R. Biceps brachii, R. Triceps brachii, R. Extensor digitorum communis, R. First dorsal interosseous, R. Abductor pollicis brevis.              Conclusion:   This is an abnormal study. 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   This involves sensory fibers with demyelinating features with no motor involvement or secondary denervation.  This is more severe on the right than the left side. In the correct clinical context, this would be consistent with a mild to moderate carpal tunnel syndrome on the right side and a mild carpal tunnel syndrome on the left side.            Impression/ Plan:  Chris Weaver is a 49 year old female, who originally presented 1/2/2024 for evaluation of right arm numbness. Patient states a few months ago, she noted numbness in R arm mainly from elbow down to all fingers, with no neck pain or radiating pain from neck to hands but some weakness when trying to open small things (like jars).  This has been actually been occurring for the past year but intermittently improves on own; more recently, she notes some tingling in whole left arm and left hand; right side is worse in digits 1-2.    NCS/EMG done showed no large fiber polyneuropathy only showing bilateral median entrapment neuropathy, Right worse than Left but her symptoms seem more consistent with cervical radiculopathy.     MRI cervical spine was done and  showed no myelopathy / intramedullary pathology but did show some mild spinal stenosis and degenerative disc disease - she has not had much improvement on gabapentin and recommend refer to PT for evaluation; B12, ESR, CRP, RF, and EM negative; will increase gabapentin to 300 mg bid    1. Bilateral carpal tunnel syndrome  As noted above   - gabapentin 300 MG Oral Cap; Take 1 capsule (300 mg total) by mouth in the morning and 1 capsule (300 mg total) before bedtime.  Dispense: 60 capsule; Refill: 2    2. Cervical radicular pain  As noted above  - gabapentin 300 MG Oral Cap; Take 1 capsule (300 mg total) by mouth in the morning and 1 capsule (300 mg total) before bedtime.  Dispense: 60 capsule; Refill: 2    3. Right arm numbness  As noted above    Return in about 3 months (around 2/6/2025).    Suraj Anthony MD, Neurology  [facility]  Pager [phone]  11/6/2024

## 2025-01-13 ENCOUNTER — HOSPITAL ENCOUNTER (OUTPATIENT)
Dept: MAMMOGRAPHY | Age: 50
Discharge: HOME | End: 2025-01-13
Attending: INTERNAL MEDICINE
Payer: COMMERCIAL

## 2025-01-13 DIAGNOSIS — Z12.31 VISIT FOR SCREENING MAMMOGRAM: ICD-10-CM

## 2025-01-13 PROCEDURE — 77067 SCR MAMMO BI INCL CAD: CPT | Performed by: INTERNAL MEDICINE

## 2025-01-13 PROCEDURE — 77063 BREAST TOMOSYNTHESIS BI: CPT | Performed by: INTERNAL MEDICINE

## 2025-01-30 ENCOUNTER — HOSPITAL ENCOUNTER (OUTPATIENT)
Dept: MAMMOGRAPHY | Facility: HOSPITAL | Age: 50
Discharge: HOME | End: 2025-01-30
Attending: INTERNAL MEDICINE
Payer: COMMERCIAL

## 2025-01-30 DIAGNOSIS — R92.2 INCONCLUSIVE MAMMOGRAM: ICD-10-CM

## 2025-01-30 PROCEDURE — 77061 BREAST TOMOSYNTHESIS UNI: CPT | Performed by: INTERNAL MEDICINE

## 2025-01-30 PROCEDURE — 76642 ULTRASOUND BREAST LIMITED: CPT | Performed by: INTERNAL MEDICINE

## 2025-01-30 PROCEDURE — 77065 DX MAMMO INCL CAD UNI: CPT | Performed by: INTERNAL MEDICINE

## 2025-02-05 ENCOUNTER — OFFICE VISIT (OUTPATIENT)
Dept: NEUROLOGY | Facility: CLINIC | Age: 50
End: 2025-02-05
Payer: COMMERCIAL

## 2025-02-05 VITALS
SYSTOLIC BLOOD PRESSURE: 106 MMHG | RESPIRATION RATE: 16 BRPM | DIASTOLIC BLOOD PRESSURE: 56 MMHG | HEIGHT: 59 IN | BODY MASS INDEX: 24.39 KG/M2 | WEIGHT: 121 LBS | HEART RATE: 76 BPM

## 2025-02-05 DIAGNOSIS — M54.12 CERVICAL RADICULAR PAIN: ICD-10-CM

## 2025-02-05 DIAGNOSIS — R20.0 RIGHT ARM NUMBNESS: Primary | ICD-10-CM

## 2025-02-05 DIAGNOSIS — G56.03 BILATERAL CARPAL TUNNEL SYNDROME: ICD-10-CM

## 2025-02-05 PROCEDURE — 99213 OFFICE O/P EST LOW 20 MIN: CPT | Performed by: OTHER

## 2025-02-05 RX ORDER — GABAPENTIN 300 MG/1
300 CAPSULE ORAL 2 TIMES DAILY
Qty: 180 CAPSULE | Refills: 1 | Status: SHIPPED | OUTPATIENT
Start: 2025-02-05 | End: 2025-02-05

## 2025-02-05 RX ORDER — GABAPENTIN 300 MG/1
300 CAPSULE ORAL NIGHTLY
Qty: 90 CAPSULE | Refills: 1 | Status: SHIPPED | OUTPATIENT
Start: 2025-02-05

## 2025-02-05 NOTE — PATIENT INSTRUCTIONS
Refill policies:    Allow 2-3 business days for refills; controlled substances may take longer.  Contact your pharmacy at least 5 days prior to running out of medication and have them send an electronic request or submit request through the “request refill” option in your [employer] account.  Refills are not addressed on weekends; covering physicians do not authorize routine medications on weekends.  No narcotics or controlled substances are refilled after noon on Fridays or by on call physicians.  By law, narcotics must be electronically prescribed.  A 30 day supply with no refills is the maximum allowed.  If your prescription is due for a refill, you may be due for a follow up appointment.  To best provide you care, patients receiving routine medications need to be seen at least once a year.  Patients receiving narcotic/controlled substance medications need to be seen at least once every 3 months.  In the event that your preferred pharmacy does not have the requested medication in stock (e.g. Backordered), it is your responsibility to find another pharmacy that has the requested medication available.  We will gladly send a new prescription to that pharmacy at your request.    Scheduling Tests:    If your physician has ordered radiology tests such as MRI or CT scans, please contact Central Scheduling at [phone] right away to schedule the test.  Once scheduled, the [facility] Centralized Referral Team will work with your insurance carrier to obtain pre-certification or prior authorization.  Depending on your insurance carrier, approval may take 3-10 days.  It is highly recommended patients assure they have received an authorization before having a test performed.  If test is done without insurance authorization, patient may be responsible for the entire amount billed.      Precertification and Prior Authorizations:  If your physician has recommended that you have a procedure or additional testing performed the [facility]  Centralized Referral Team will contact your insurance carrier to obtain pre-certification or prior authorization.    You are strongly encouraged to contact your insurance carrier to verify that your procedure/test has been approved and is a COVERED benefit.  Although the [facility] Centralized Referral Team does its due diligence, the insurance carrier gives the disclaimer that \"Although the procedure is authorized, this does not guarantee payment.\"    Ultimately the patient is responsible for payment.   Thank you for your understanding in this matter.  Paperwork Completion:  If you require FMLA or disability paperwork for your recovery, please make sure to either drop it off or have it faxed to our office at [phone]. Be sure the form has your name and date of birth on it.  The form will be faxed to our Forms Department and they will complete it for you.  There is a 25$ fee for all forms that need to be filled out.  Please be aware there is a 10-14 day turnaround time.  You will need to sign a release of information (CHAPIN) form if your paperwork does not come with one.  You may call the Forms Department with any questions at [phone].  Their fax number is [phone].

## 2025-02-05 NOTE — PROGRESS NOTES
[facility] Progress Note    HPI  Chief Complaint   Patient presents with    Carpal Tunnel Syndrome     Follow up    Neck Pain     3 month follow up        As per my initial H&P from 1/2/2024,   \" Chris Weaver is a 48 year old, who presents for evaluation of right arm numbness.  Patient states a few months ago, she noted numbness in R arm mainly from elbow down to all fingers, with no neck pain or radiating pain from neck to hands but some weakness when trying to open small things (like jars).  This has been actually been occurring for the past year but intermittently improves on own; more recently, she notes some tingling in whole left arm and left hand; right side is worse in digits 1-2.  She denies numbness in feet of tingling in feet; notes chronic feeling of \"cold sensation\" in hands; denies dropping objects from left hand or tripping over feet or balance issues or falls.     She denies shooting pains from neck to hands when flexing forward.      She notes some improvement in right hand when flexing elbow R side when walking but worse when dropping arm at side.    She denies recent or past injury to neck.     She denies double vision, loss of vision or bowel / bladder changes and has chronic headaches / migraines; she was   Otherwise, patient denies any recent weight change, fevers, chills, nausea, double vision/ blurry vision / loss of vision, chest pain, palpitations, shortness of breath, rashes, joint pains, bowel / bladder incontinence or mood issues. \"      Prior notes as per 4/12/2024.  Patient since last visit temporarily noted improvement in tingling in hands when she started taking gabapentin 300 mg nightly; however, in the past month, she has been having numbness in right arm from shoulder down to all fingers not clearly associated with position but having more pain at night time in same area; she denies neck pain and denies dropping objects from hands; no balance issues or  falls or vision changes or vertigo; no new other numbness/tingling/balance issues.        Prior notes as per 7/17/2024.  Patient last seen 4/12/2024. She notes tingling and numbness on right side when lying on her side or when putting arms down - she denies weakness or dropping objects from hands. She has been taking gabapentin but only taking at night rather than 3 times daily. She denies balance issues, falls or new focal numbness/tingling/weakness.       Prior notes as per 11/6/2024.  Patient last seen 7/17/2024.  She still has tingling and numbness when lying on her side. She remains on gabapentin 300 mg nightly. She has been wearing wrist splints at night and notes numbness in R hand from elbow down to wrist; she did not see PT since last visit but states pain is not waking her up at night anymore; she has not been dropping objects from hands and denies numbness in feet.        Patient last seen 11/6/2024. She did not see PT but has been wearing wrist splints at night; she takes gabapentin 300 mg nightly but has not been taking during the day; overall, she is feeling better however and denies new numbness/tingling/pain or dropping objects from hands. She still has occasoinal numbness in R arm moving from shoulder to hand - no worsening.     Past Medical History:    Bilateral dry eyes    Meibomian gland dysfunction (MGD), bilateral, both upper and lower lids    Meibomian gland dysfunction (MGD), bilateral, both upper and lower lids     Past Surgical History:   Procedure Laterality Date    Shannon localization wire 1 site left (cpt=19281) Left     Sebaceous cyst removed @ 30 y/o?    Skin surgery  01/01/2005    sebaceous cyst removal between breasts     Family History   Problem Relation Age of Onset    Hypertension Father     Hypertension Brother     Glaucoma Brother     Diabetes Neg     Macular degeneration Neg      Social History     Socioeconomic History    Marital status:    Tobacco Use    Smoking status:  Never     Passive exposure: Never    Smokeless tobacco: Never   Vaping Use    Vaping status: Never Used   Substance and Sexual Activity    Alcohol use: Yes     Comment: Social    Drug use: No   Other Topics Concern    Caffeine Concern Yes     Comment: 1/2 cup occasionally    Exercise Yes     Comment: Treadmill 2-3 x week       No Known Allergies      Current Outpatient Medications:     gabapentin 300 MG Oral Cap, Take 1 capsule (300 mg total) by mouth nightly., Disp: 90 capsule, Rfl: 1    ibuprofen 600 MG Oral Tab, Take 1 tablet (600 mg total) by mouth every 6 (six) hours as needed for Pain., Disp: , Rfl:     omega-3 fatty acids 1000 MG Oral Cap, Take 1,000 mg by mouth daily., Disp: , Rfl:     diphenhydrAMINE HCl, Sleep, (SLEEP AID) 25 MG Oral Cap, Take by mouth., Disp: , Rfl:     Trolamine Salicylate (ASPERCREME ORIGINAL EX), Apply topically., Disp: , Rfl:     acetaminophen 500 MG Oral Tab, Take 1 tablet (500 mg total) by mouth every 6 (six) hours as needed for Pain., Disp: , Rfl:     Vitamin C 500 MG Oral Tab, Take 1 tablet (500 mg total) by mouth daily., Disp: , Rfl:     folic acid (FOLVITE) 400 MCG Oral Tab, Take 1 tablet (400 mcg total) by mouth daily., Disp: , Rfl:     Review of Systems:  No chest pain or palpitations; otherwise as noted in HPI.    Exam:  /56 (BP Location: Left arm, Patient Position: Sitting, Cuff Size: adult)   Pulse 76   Resp 16   Ht 59\"   Wt 121 lb (54.9 kg)   LMP 12/01/2024 (Exact Date)   BMI 24.44 kg/m²   Estimated body mass index is 24.44 kg/m² as calculated from the following:    Height as of this encounter: 59\".    Weight as of this encounter: 121 lb (54.9 kg).    Gen: well developed, well nourished, no acute distress  HEENT: normocephalic  Heart; normal S1/S2, regular rate and rhythm  Lungs: clear to auscultation bilaterally  Extremities: no edema, peripheral pulses intact    Neck: supple, full range of motion; no carotid bruits    Fundoscopic Exam: optic discs sharp  bilaterally    Neuro:  Mental status:  Orientation: Alert and oriented to person, place, time  Speech Fluent and conversational    CN: PERRL, EOMI with no nystagmus, VFF, smile symmetric, sensation intact, tongue and palate midline, SCM intact, otherwise, CN 2-12 intact  Motor: 5/5 strength throughout, tone normal  DTR: 1+ symmetric throughout UE, 2+ in patellar, trace ankle jerks  R ankle jerk;, toes downgoing bilaterally, no clonus  Sensory: intact to light touch, pin, vibration and proprioception throughout  Coord: FNF intact with no tremor or dysmetria; rapid alternating movements intact bilaterally  Romberg: absent  Gait: normal casual, heel, toe and tandem gait    Labs:    None new    Prior as noted below    Component      Latest Ref Rng 5/3/2024   Expanded JUVENTINO Antibody Screen, IGG      <0.7 ug/l 0.40    Anti-dsDNA antibody      <10 IU/mL 1.5    Connective Tissue Disease Screen Interpretation      Negative  Negative    C-REACTIVE PROTEIN      <1.00 mg/dL <0.40    RHEUMATOID FACTOR      <14 IU/mL <10    SED RATE      0 - 20 mm/Hr 12    Vitamin B12      211 - 911 pg/mL 683            Imaging:  None new    Prior as noted below    MRI cervical spine (5/10/2024)    FINDINGS: Straightening of the normal cervical lordosis. No cervical spondylolisthesis or fracture. Cervical vertebral body heights are normal. Minimal type I degenerative endplate signal changes at C5-C6 and C6-C7. Cervical vertebral marrow signal is   otherwise normal. The paraspinal musculature is symmetric and normal in morphology and signal. The cervical spinal cord is normal in caliber and signal. No abnormal cervical spinal cord enhancement is identified on postcontrast imaging. No abnormal   intrathecal enhancement is identified in the cervical spine. Subcentimeter perineural cysts at the lateral aspects of the neural foramina on the left at the C7-T1 level and on the right at the T1-T2 level are of questionable clinical significance. No   Chiari  malformation.     C1-C2: The atlantoaxial articulation appears normal. No spinal stenosis.     C2-C3: Disc desiccation and normal disc height. No disc herniation or disc bulge. No significant facet arthrosis. No spinal or neural foraminal stenosis.     C3-C4, C4-C5: Disc desiccation and normal disc heights. Mild central disc bulges indent the ventral thecal sac and abut the ventral aspect of the cord at both levels. No significant facet arthrosis. Borderline spinal stenosis (10 mm AP canal dimension).   No significant neural foraminal stenosis.     C5-C6: Disc desiccation and normal disc height. A small central disc protrusion indents the ventral thecal sac and abuts the ventral aspect of the cord. No significant facet arthrosis. Mild spinal stenosis (9 mm AP canal dimension). No significant neural    foraminal stenosis.     C6-C7: Disc desiccation and normal disc height. No disc herniation or disc bulge. No significant facet arthrosis. No spinal or neural foraminal stenosis.     C7-T1: Disc height and signal are normal. Mild central disc bulge indents the ventral thecal sac. No significant facet arthrosis. Borderline spinal stenosis (10 mm AP canal dimension). No significant neural foraminal stenosis.     IMPRESSION:   1. Mild multilevel cervical spondylosis. Mild central disc bulges at C3-C4 and C4-C5. Small central disc protrusion at C5-C6. Mild central disc bulge at C7-T1.     2. Borderline spinal stenosis at C3-C4 and C4-C5. Mild spinal stenosis at C5-C6. Borderline spinal stenosis at C7-T1. No significant cervical neural foraminal stenosis.     3. No cervical spinal cord abnormalities are identified.     4. Straightening of the normal cervical lordosis which may be positional or related to muscle spasm. No cervical spondylolisthesis or fracture.         Other procedures:    None new    Prior as noted below    NCS/EMG 2/6/2024):   Sensory NCS      Nerve / Sites Rec. Site Onset Lat Peak Lat NP Amp PP Amp  Segments Distance Peak Diff Velocity Comment       ms ms µV µV   cm ms m/s     R Median - Dig II (Antidromic)      Wrist Index 4.01 4.90 47.0 90.7 Wrist - Index 14   35        Ref.   <=3.30 <=4.00 >=17.0 >=19.0 Ref.           L Median - Dig II (Antidromic)      Wrist Index 2.81 3.70 62.4 103.8 Wrist - Index 13   46        Ref.   <=3.30 <=4.00 >=17.0 >=19.0 Ref.           R Ulnar - Dig V (Antidromic)      Wrist Dig V 2.08 2.92 84.7 128.5 Wrist - Dig V 11   53        Ref.   <=3.10 <=4.00 >=14.0 >=13.0 Ref.           L Ulnar - Dig V (Antidromic)      Wrist Dig V 1.93 2.60 65.5 88.2 Wrist - Dig V 11   57        Ref.   <=3.10 <=4.00 >=14.0 >=13.0 Ref.           R Radial - Superficial (Antidromic)      Forearm Wrist 1.88 2.50 51.8 72.8 Forearm - Wrist 10   53        Ref.   <=2.20 <=2.80 >=7.0 >=11.0 Ref.              2 Wrist 1.93 2.50 51.5 76.5             L Radial - Superficial (Antidromic)      Forearm Wrist 1.67 2.19 64.0 64.4 Forearm - Wrist 10   60        Ref.   <=2.20 <=2.80 >=7.0 >=11.0 Ref.           R Median, Ulnar - Transcarpal comparison      Median Palm Wrist 2.40 3.23 26.1 23.1 Median Palm - Wrist 8   33        Ulnar Palm Wrist 1.30 1.82 33.3 36.3 Ulnar Palm - Wrist 8   61                 Median Palm - Ulnar Palm   1.41                   Ref.   <=0.40       L Median, Ulnar - Transcarpal comparison      Median Palm Wrist 1.93 2.55 26.3 25.8 Median Palm - Wrist 8   42        Ulnar Palm Wrist 1.25 1.77 28.7 36.5 Ulnar Palm - Wrist 8   64                 Median Palm - Ulnar Palm   0.78                   Ref.   <=0.40           Motor NCS      Nerve / Sites Muscle Latency Amplitude Segments Dist. Lat Diff Velocity Comments       ms mV   cm ms m/s     R Median - APB      Wrist APB 4.69 10.5 Wrist - APB 7            Ref.   <=4.40 >=4.2 Ref.              Elbow APB 8.29 10.4 Elbow - Wrist 19.5 3.60 54.1        Ref.       Ref.     >=51.0     L Median - APB      Wrist APB 3.75 10.8 Wrist - APB 7            Ref.   <=4.40  >=4.2 Ref.              Elbow APB 7.17 10.2 Elbow - Wrist 20 3.42 58.5        Ref.       Ref.     >=51.0     R Ulnar - ADM      Wrist ADM 2.73 9.6 Wrist - ADM 7            Ref.   <=3.70 >=7.9 Ref.              B.Elbow ADM 5.42 9.8 B.Elbow - Wrist 17 2.69 63.3        Ref.       Ref.     >=52.0        A.Elbow ADM 7.23 8.9 A.Elbow - B.Elbow 10 1.81 55.2        Ref.       Ref.     >=43.0     L Ulnar - ADM      Wrist ADM 2.50 10.2 Wrist - ADM 7            Ref.   <=3.70 >=7.9 Ref.              B.Elbow ADM 5.08 10.2 B.Elbow - Wrist 16 2.58 61.9        Ref.       Ref.     >=52.0        A.Elbow ADM 6.83 9.1 A.Elbow - B.Elbow 10 1.75 57.1        Ref.       Ref.     >=43.0         F  Wave      Nerve M Latency F Latency     ms ms   R Median - APB 5.3 26.0   Ref.   <=25.7   R Ulnar - ADM 3.1 25.0   Ref.   <=26.1   L Median - APB 4.0 24.8   Ref.   <=25.7   L Ulnar - ADM 2.2 23.3   Ref.   <=26.1                   EMG Summary Table       Spontaneous MUAP Recruitment   Muscle IA Fib PSW Fasc H.F. Amp Dur. PPP Pattern   L. Deltoid N None None None None N N N N   L. Biceps brachii N None None None None N N N N   L. Triceps brachii N None None None None N N N N   L. Extensor digitorum communis N None None None None N N N N   L. First dorsal interosseous N None None None None N N N N   L. Abductor pollicis brevis N None None None None N N N N   R. Deltoid N None None None None N N N N   R. Biceps brachii N None None None None N N N N   R. Triceps brachii N None None None None N N N N   R. Extensor digitorum communis N None None None None N N N N   R. First dorsal interosseous N None None None None N N N N   R. Abductor pollicis brevis N None None None None N N N N       Summary     The motor conduction test was performed on 4 nerve(s). The results were normal in 3 nerve(s): L Median - APB, R Ulnar - ADM, L Ulnar - ADM. Results outside the specified normal range were found in 1 nerve(s), as follows:  In the R Median - APB study  the take  off latency result was increased for Wrist stimulation     The sensory conduction test was performed on 8 nerve(s). The results were normal in 5 nerve(s): L Median - Dig II (Antidromic), R Ulnar - Dig V (Antidromic), L Ulnar - Dig V (Antidromic), R Radial - Superficial (Antidromic), L Radial - Superficial (Antidromic). Findings were unremarkable in 2 nerve(s): R Median, Ulnar - Transcarpal comparison, L Median, Ulnar - Transcarpal comparison. Results outside the specified normal range were found in 1 nerve(s), as follows:  In the R Median - Dig II (Antidromic) study  the peak latency result was increased for Wrist stimulation     The F wave study was unremarkable in all 4 of the tested nerves: R Median - APB, R Ulnar - ADM, L Median - APB, L Ulnar - ADM     The needle EMG study was normal in all 12 tested muscles: L. Deltoid, L. Biceps brachii, L. Triceps brachii, L. Extensor digitorum communis, L. First dorsal interosseous, L. Abductor pollicis brevis, R. Deltoid, R. Biceps brachii, R. Triceps brachii, R. Extensor digitorum communis, R. First dorsal interosseous, R. Abductor pollicis brevis.              Conclusion:   This is an abnormal study. 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   This involves sensory fibers with demyelinating features with no motor involvement or secondary denervation.  This is more severe on the right than the left side. In the correct clinical context, this would be consistent with a mild to moderate carpal tunnel syndrome on the right side and a mild carpal tunnel syndrome on the left side.            Impression/ Plan:  Chris Weaver is a 49 year old female, who originally presented 1/2/2024 for evaluation of right arm numbness. Patient states a few months ago, she noted numbness in R arm mainly from elbow down to all fingers, with no neck pain or radiating pain from neck to hands but some weakness when  trying to open small things (like jars).  This has been actually been occurring for the past year but intermittently improves on own; more recently, she notes some tingling in whole left arm and left hand; right side is worse in digits 1-2.    NCS/EMG done showed no large fiber polyneuropathy only showing bilateral median entrapment neuropathy, Right worse than Left but her symptoms seem more consistent with cervical radiculopathy.     MRI cervical spine was done and showed no myelopathy / intramedullary pathology but did show some mild spinal stenosis and degenerative disc disease - she was having mild improvement on gabapentin and recommended referral  to PT for evaluation; B12, ESR, CRP, RF, and EM negative;now on gabapentin at 300 mg nightly and will continue.     1. Bilateral carpal tunnel syndrome  As noted above   - gabapentin 300 MG Oral Cap; Take 1 capsule (300 mg total) by mouth nightly.  Dispense: 90 capsule; Refill: 1    2. Cervical radicular pain  As noted above   - gabapentin 300 MG Oral Cap; Take 1 capsule (300 mg total) by mouth nightly.  Dispense: 90 capsule; Refill: 1    3. Right arm numbness  As noted above    Return in about 4 months (around 6/5/2025).    Suraj Anthony MD, Neurology  [facility]  Pager [phone]  2/5/2025

## (undated) NOTE — LETTER
June 5, 2018     [facility] [place] [address]      Dear Bala Apple:    Below are the results from your recent visit:  PAP SMEAR NEGATIVE   HPV NEGATIVE     Resulted Orders   HPV HIGH RISK , THIN PREP COLLECTION   Result Value Ref R Authorizing Provider:  Sourav Mccormick MD        Collected:           06/01/2018 01:03 PM          Ordering Location:     [facility]  Received:            06/01/2018 06:14 PM                                 [facility]

## (undated) NOTE — LETTER
June, 22, 2021    To Whom It May Concern,    Chris Ying (3/17/1975) is under my medical care. She was seen for her annual physical exam today. Patient was  found to be in excellent health without any known communicable diseases.   She is fit

## (undated) NOTE — LETTER
June 28, 2021     [address] [facility] [address]      Dear Shira Lackey:    Below are the results from your recent visit:    Pap negative   Hpv negative   Repeat in 3 years       Resulted Orders   HPV HIGH RISK , [address] [facility] Screen:          Fabiano Ko                                                               Specimen:     ThinPrep Imager Screening Pap, Cervical/endocervical                                        If you have any que

## (undated) NOTE — LETTER
9/26/2023              Chris Carr        [facility] [id number]       To Whom It May Concern,    Chris Carr (3/17/1975) is under my medical care. She is was seen for a complete physical examination and is found to be in excellent health. 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 Sincerely,    Megha Carballo MD  [facility], [place] [address] [address] [phone]        Document electronically generated by:   Megha Carballo MD

## (undated) NOTE — Clinical Note
4/27/2017          To Whom It May Concern:    Douglas Lam is currently under my medical care. Please be advised that the patient was seen for her physical exam and should be fit to work.     If you require additional information please contact our office

## (undated) NOTE — LETTER
07/08/19        [address]      Dear Hilary Friend,    Our records indicate that you have outstanding lab work and or testing that was ordered for you and has not yet been completed:  Orders Placed This Encounter

## (undated) NOTE — LETTER
9/26/2023              [address] [place] [id number]         Dear Chanell Seaman,    It was a pleasure to see you. Your {EM NORMAL LETTER:2109284} was normal.  There is no need for further testing at this time. I look forward to seeing you at your next scheduled appointment. Sincerely,    Alberta Torrez MD  [facility], [place] [address]  [phone]        Document electronically generated by:   Alberta Torrez MD

## (undated) NOTE — LETTER
2018              [address] [id number]         To whom it may concern,    Hermilo Madrigal  (:3/17/1975) is under my medical care.   She was seen for a complete physical exam to day and was found to be

## (undated) NOTE — LETTER
6/12/2020              [phone]         To Whom It May Concern,    Kang Mcintyre (3/17/1975) is under my medical care.   She was seen for a physical exam today and was found to be in good ge

## (undated) NOTE — LETTER
6/4/2019              Chris Weaver        [address] [facility] [address]       To Whom It May Concern,    Medhat Valentin (3/17/1975) is under my medical care.   She  had a physical exam today and was found in good general health  Patient

## (undated) NOTE — MR AVS SNAPSHOT
[facility] SPECIALTY [facility] [place] [address] [address] [address] [phone]               Thank you for choosing us for your health care visit with Sim Pantoja MD.  We are glad to serve you and happy to provide you with this summary of y You can access your MyChart to more actively manage your health care and view more details from this visit by going to [email or web address].   If you've recently had a stay at the Hospital you can access your discharge instructions in [address] by shanita

## (undated) NOTE — LETTER
6/22/2021              Chris Fajardo        [facility] [phone]         To Whom It May Concern,    Chris Fajardo (3/17/1975) is under my medical care. She as seen for her annual physical exam today.   Patient is f

## (undated) NOTE — LETTER
8/3/2022              Ivylyn Marylene Links         [address] [facility] [address]         To Whom It May Concern,    Ivylyn Marylene Links (3/17/1975) is under my medical care. She was seen for a general physical examination today. She is found to be in good general health with no known communicable diseases. 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 Sincerely,    MD Jc Nash Apeldoorn, [address] [place] [address]  [phone]        Document electronically generated by:   Arya Mcleod MD

## (undated) NOTE — LETTER
June 24, 2021     [facility] [address]      Dear Nazario Acosta:    Below are the results from your recent visit:Hpv negative       Resulted Orders   HPV HIGH RISK , THIN PREP COLLECTION   Result Value Ref Range    HPV Hi